# Patient Record
Sex: MALE | Race: WHITE | Employment: FULL TIME | URBAN - METROPOLITAN AREA
[De-identification: names, ages, dates, MRNs, and addresses within clinical notes are randomized per-mention and may not be internally consistent; named-entity substitution may affect disease eponyms.]

---

## 2017-08-28 ENCOUNTER — HOSPITAL ENCOUNTER (EMERGENCY)
Facility: HOSPITAL | Age: 37
Discharge: HOME | End: 2017-08-28
Attending: EMERGENCY MEDICINE
Payer: COMMERCIAL

## 2017-08-28 VITALS
OXYGEN SATURATION: 97 % | TEMPERATURE: 98 F | SYSTOLIC BLOOD PRESSURE: 143 MMHG | HEIGHT: 71 IN | WEIGHT: 220 LBS | BODY MASS INDEX: 30.8 KG/M2 | RESPIRATION RATE: 17 BRPM | HEART RATE: 88 BPM | DIASTOLIC BLOOD PRESSURE: 87 MMHG

## 2017-08-28 DIAGNOSIS — B07.0 PLANTAR WART OF RIGHT FOOT: Primary | ICD-10-CM

## 2017-08-28 PROCEDURE — 99282 EMERGENCY DEPT VISIT SF MDM: CPT

## 2017-08-29 NOTE — ED INITIAL ASSESSMENT (HPI)
Pt ambulatory to er cc right foot = wart x two wks & yesterday used OTC wart remover/freezer = now larger and redness.  Pain with wt bearing

## 2017-08-29 NOTE — ED PROVIDER NOTES
Patient Seen in: [facility] Emergency Department    History   Patient presents with:  Cellulitis (integumentary, infectious)    Stated Complaint: [place]    HPI    Previously healthy 18-year-old presents for evaluation of a wart on h evidence of infection a dressing was placed and he was given a postop shoe and can follow-up with podiatry as needed return for signs or symptoms of infection such as increased redness or drainage of pus.     Disposition and Plan     Clinical Impression:  P

## 2019-01-11 ENCOUNTER — APPOINTMENT (OUTPATIENT)
Dept: GENERAL RADIOLOGY | Age: 39
End: 2019-01-11
Attending: EMERGENCY MEDICINE
Payer: COMMERCIAL

## 2019-01-11 ENCOUNTER — APPOINTMENT (OUTPATIENT)
Dept: ULTRASOUND IMAGING | Age: 39
End: 2019-01-11
Attending: EMERGENCY MEDICINE
Payer: COMMERCIAL

## 2019-01-11 ENCOUNTER — HOSPITAL ENCOUNTER (EMERGENCY)
Age: 39
Discharge: HOME | End: 2019-01-11
Attending: EMERGENCY MEDICINE
Payer: COMMERCIAL

## 2019-01-11 VITALS
DIASTOLIC BLOOD PRESSURE: 84 MMHG | BODY MASS INDEX: 30.8 KG/M2 | HEIGHT: 71 IN | WEIGHT: 220 LBS | OXYGEN SATURATION: 99 % | TEMPERATURE: 98 F | RESPIRATION RATE: 16 BRPM | SYSTOLIC BLOOD PRESSURE: 138 MMHG | HEART RATE: 87 BPM

## 2019-01-11 DIAGNOSIS — S86.112A GASTROCNEMIUS MUSCLE STRAIN, LEFT, INITIAL ENCOUNTER: Primary | ICD-10-CM

## 2019-01-11 PROCEDURE — 99284 EMERGENCY DEPT VISIT MOD MDM: CPT

## 2019-01-11 PROCEDURE — 93971 EXTREMITY STUDY: CPT | Performed by: EMERGENCY MEDICINE

## 2019-01-11 PROCEDURE — 73590 X-RAY EXAM OF LOWER LEG: CPT | Performed by: EMERGENCY MEDICINE

## 2019-01-12 NOTE — ED INITIAL ASSESSMENT (HPI)
Pt to the ED for evaluation of L leg pain that started after a \"pushing\" injury on Monday. No medications taken PTA.

## 2019-01-12 NOTE — ED PROVIDER NOTES
Patient Seen in: THE [facility] Emergency Department In [place]    History   Patient presents with:  Lower Extremity Injury (musculoskeletal)    Stated Complaint: left leg pain    HPI    Patient complains of left leg pain.   Patient was actively plantar flexin Achilles tendon and muscle belly of the medial gastroc. The lateral gastroc is nontender. The Achilles itself is nontender. Iverson's test shows good plantar flexion of the foot. Dorsalis pedis pulses strong and symmetric.   Sensation intact light touc visit.  There is no disposition time on file for this visit.     Follow-up:  Braulio Osorio MD  [address] [facility] [phone]      As needed        Medications Prescribed:  There are no discharge medications for this patien

## 2019-02-19 ENCOUNTER — TELEPHONE (OUTPATIENT)
Dept: FAMILY MEDICINE CLINIC | Facility: CLINIC | Age: 39
End: 2019-02-19

## 2019-02-19 NOTE — TELEPHONE ENCOUNTER
Pt's wife called back. She states she scheduled the appt for her . She states pt has been having a lot of epigastric pain. He did take otc meds for acid/heartburn that helps but symptoms return.  He is currently at work in [place] and is unable to come

## 2019-02-25 ENCOUNTER — APPOINTMENT (OUTPATIENT)
Dept: LAB | Age: 39
End: 2019-02-25
Attending: FAMILY MEDICINE
Payer: COMMERCIAL

## 2019-02-25 ENCOUNTER — OFFICE VISIT (OUTPATIENT)
Dept: FAMILY MEDICINE CLINIC | Facility: CLINIC | Age: 39
End: 2019-02-25
Payer: COMMERCIAL

## 2019-02-25 VITALS
RESPIRATION RATE: 16 BRPM | WEIGHT: 226 LBS | BODY MASS INDEX: 30.61 KG/M2 | SYSTOLIC BLOOD PRESSURE: 122 MMHG | OXYGEN SATURATION: 99 % | DIASTOLIC BLOOD PRESSURE: 78 MMHG | TEMPERATURE: 98 F | HEIGHT: 72 IN | HEART RATE: 88 BPM

## 2019-02-25 DIAGNOSIS — R07.2 SUBSTERNAL PAIN: ICD-10-CM

## 2019-02-25 DIAGNOSIS — R10.13 EPIGASTRIC PAIN: ICD-10-CM

## 2019-02-25 DIAGNOSIS — Z00.00 ROUTINE ADULT HEALTH MAINTENANCE: Primary | ICD-10-CM

## 2019-02-25 DIAGNOSIS — Z00.00 ROUTINE ADULT HEALTH MAINTENANCE: ICD-10-CM

## 2019-02-25 LAB
ALBUMIN SERPL-MCNC: 4.3 G/DL (ref 3.4–5)
ALBUMIN/GLOB SERPL: 1.2 {RATIO} (ref 1–2)
ALP LIVER SERPL-CCNC: 71 U/L (ref 45–117)
ALT SERPL-CCNC: 96 U/L (ref 16–61)
ANION GAP SERPL CALC-SCNC: 9 MMOL/L (ref 0–18)
AST SERPL-CCNC: 39 U/L (ref 15–37)
BILIRUB SERPL-MCNC: 1.5 MG/DL (ref 0.1–2)
BUN BLD-MCNC: 13 MG/DL (ref 7–18)
BUN/CREAT SERPL: 13.5 (ref 10–20)
CALCIUM BLD-MCNC: 9.1 MG/DL (ref 8.5–10.1)
CHLORIDE SERPL-SCNC: 109 MMOL/L (ref 98–107)
CHOLEST SMN-MCNC: 195 MG/DL (ref ?–200)
CO2 SERPL-SCNC: 24 MMOL/L (ref 21–32)
CREAT BLD-MCNC: 0.96 MG/DL (ref 0.7–1.3)
DEPRECATED RDW RBC AUTO: 42.2 FL (ref 35.1–46.3)
ERYTHROCYTE [DISTWIDTH] IN BLOOD BY AUTOMATED COUNT: 13.1 % (ref 11–15)
GLOBULIN PLAS-MCNC: 3.5 G/DL (ref 2.8–4.4)
GLUCOSE BLD-MCNC: 101 MG/DL (ref 70–99)
HCT VFR BLD AUTO: 50.3 % (ref 39–53)
HDLC SERPL-MCNC: 27 MG/DL (ref 40–59)
HGB BLD-MCNC: 16.9 G/DL (ref 13–17.5)
LDLC SERPL CALC-MCNC: 117 MG/DL (ref ?–100)
M PROTEIN MFR SERPL ELPH: 7.8 G/DL (ref 6.4–8.2)
MCH RBC QN AUTO: 29.4 PG (ref 26–34)
MCHC RBC AUTO-ENTMCNC: 33.6 G/DL (ref 31–37)
MCV RBC AUTO: 87.5 FL (ref 80–100)
NONHDLC SERPL-MCNC: 168 MG/DL (ref ?–130)
OSMOLALITY SERPL CALC.SUM OF ELEC: 294 MOSM/KG (ref 275–295)
PLATELET # BLD AUTO: 176 10(3)UL (ref 150–450)
POTASSIUM SERPL-SCNC: 4.2 MMOL/L (ref 3.5–5.1)
RBC # BLD AUTO: 5.75 X10(6)UL (ref 4.3–5.7)
SODIUM SERPL-SCNC: 142 MMOL/L (ref 136–145)
T4 FREE SERPL-MCNC: 1 NG/DL (ref 0.8–1.7)
TRIGL SERPL-MCNC: 254 MG/DL (ref 30–149)
TSI SER-ACNC: 1.07 MIU/ML (ref 0.36–3.74)
VLDLC SERPL CALC-MCNC: 51 MG/DL (ref 0–30)
WBC # BLD AUTO: 6.9 X10(3) UL (ref 4–11)

## 2019-02-25 PROCEDURE — 83013 H PYLORI (C-13) BREATH: CPT | Performed by: FAMILY MEDICINE

## 2019-02-25 PROCEDURE — 84439 ASSAY OF FREE THYROXINE: CPT | Performed by: FAMILY MEDICINE

## 2019-02-25 PROCEDURE — 80061 LIPID PANEL: CPT | Performed by: FAMILY MEDICINE

## 2019-02-25 PROCEDURE — 36415 COLL VENOUS BLD VENIPUNCTURE: CPT | Performed by: FAMILY MEDICINE

## 2019-02-25 PROCEDURE — 99203 OFFICE O/P NEW LOW 30 MIN: CPT | Performed by: FAMILY MEDICINE

## 2019-02-25 PROCEDURE — 80050 GENERAL HEALTH PANEL: CPT | Performed by: FAMILY MEDICINE

## 2019-02-25 NOTE — PROGRESS NOTES
HPI:   Jie Galeano is a 35year old male that presents for Patient presents with:  Abdominal Pain: started 3 months ago on and off and has been more persistent within the last week.  patient states that he feels like there is a \"ball stuck in his chest\ and rhythm, no murmurs, rubs or gallops. LUNGS: Clear to auscultation bilterally, no rales/rhonchi/wheezing. ABDOMEN:  Soft, nondistended, nontender, bowel sounds normal in all 4 quadrants, no masses, no hepatosplenomegaly.   EXTREMITIES:  No edema, no cy

## 2019-02-25 NOTE — PATIENT INSTRUCTIONS
THE NEXT TIME YOU HAVE THE SYMPTOM OF \"BALL STUCK IN CHEST\" TAKE RANITIDINE(ZANTAC) 150MG TWICE A DAY AS NEEDED. PLEASE COMPLETE LABS AND H.PYLORI TESTING. FOLLOW UP AFTER THAT LATER THIS WEEK OR NEXT WEEK.

## 2019-02-26 LAB — H. PYLORI BREATH TEST: NEGATIVE

## 2019-03-09 ENCOUNTER — TELEPHONE (OUTPATIENT)
Dept: FAMILY MEDICINE CLINIC | Facility: CLINIC | Age: 39
End: 2019-03-09

## 2019-03-16 ENCOUNTER — OFFICE VISIT (OUTPATIENT)
Dept: FAMILY MEDICINE CLINIC | Facility: CLINIC | Age: 39
End: 2019-03-16
Payer: COMMERCIAL

## 2019-03-16 ENCOUNTER — TELEPHONE (OUTPATIENT)
Dept: FAMILY MEDICINE CLINIC | Facility: CLINIC | Age: 39
End: 2019-03-16

## 2019-03-16 VITALS
DIASTOLIC BLOOD PRESSURE: 80 MMHG | OXYGEN SATURATION: 98 % | SYSTOLIC BLOOD PRESSURE: 130 MMHG | HEART RATE: 93 BPM | WEIGHT: 227 LBS | BODY MASS INDEX: 30.75 KG/M2 | TEMPERATURE: 98 F | RESPIRATION RATE: 12 BRPM | HEIGHT: 72 IN

## 2019-03-16 DIAGNOSIS — R74.8 ELEVATED LIVER ENZYMES: Primary | ICD-10-CM

## 2019-03-16 DIAGNOSIS — R73.9 ELEVATED BLOOD SUGAR: ICD-10-CM

## 2019-03-16 DIAGNOSIS — R79.89 ELEVATED LFTS: Primary | ICD-10-CM

## 2019-03-16 DIAGNOSIS — E78.00 HIGH CHOLESTEROL: ICD-10-CM

## 2019-03-16 DIAGNOSIS — E66.09 CLASS 1 OBESITY DUE TO EXCESS CALORIES WITHOUT SERIOUS COMORBIDITY WITH BODY MASS INDEX (BMI) OF 30.0 TO 30.9 IN ADULT: ICD-10-CM

## 2019-03-16 PROCEDURE — 99214 OFFICE O/P EST MOD 30 MIN: CPT | Performed by: FAMILY MEDICINE

## 2019-03-16 NOTE — TELEPHONE ENCOUNTER
Please inform patient to get abdominal ultrasound done when he can. Also to have repeat CMP nonfasting in 1 month and follow-up after that.

## 2019-03-16 NOTE — PROGRESS NOTES
HPI:   Luz Brandt is a 35year old male that presents for lab results and epigastric pain    Patient states epigastric pain has resolved  Lab results    Past medical, surgical, family and social history reviewed in detail with patient.   Updated where a enzymes  - US ABDOMEN LIMITED (CPT=76705); Future    2. Elevated blood sugar    3. High cholesterol    4.  Class 1 obesity due to excess calories without serious comorbidity with body mass index (BMI) of 30.0 to 30.9 in adult    Abdominal pain has resolved

## 2019-03-18 NOTE — TELEPHONE ENCOUNTER
See Recent lab results. Pt informed of both test results and recommendations and he expressed understanding and agreement. Task completed.

## 2020-11-18 ENCOUNTER — OFFICE VISIT (OUTPATIENT)
Dept: FAMILY MEDICINE CLINIC | Facility: CLINIC | Age: 40
End: 2020-11-18
Payer: COMMERCIAL

## 2020-11-18 VITALS
TEMPERATURE: 99 F | HEART RATE: 85 BPM | HEIGHT: 72 IN | SYSTOLIC BLOOD PRESSURE: 138 MMHG | WEIGHT: 217 LBS | DIASTOLIC BLOOD PRESSURE: 88 MMHG | OXYGEN SATURATION: 99 % | RESPIRATION RATE: 20 BRPM | BODY MASS INDEX: 29.39 KG/M2

## 2020-11-18 DIAGNOSIS — Z20.822 SUSPECTED COVID-19 VIRUS INFECTION: Primary | ICD-10-CM

## 2020-11-18 PROCEDURE — 99072 ADDL SUPL MATRL&STAF TM PHE: CPT | Performed by: NURSE PRACTITIONER

## 2020-11-18 PROCEDURE — 3075F SYST BP GE 130 - 139MM HG: CPT | Performed by: NURSE PRACTITIONER

## 2020-11-18 PROCEDURE — 3079F DIAST BP 80-89 MM HG: CPT | Performed by: NURSE PRACTITIONER

## 2020-11-18 PROCEDURE — 3008F BODY MASS INDEX DOCD: CPT | Performed by: NURSE PRACTITIONER

## 2020-11-18 PROCEDURE — 99213 OFFICE O/P EST LOW 20 MIN: CPT | Performed by: NURSE PRACTITIONER

## 2020-11-18 NOTE — PROGRESS NOTES
CHIEF COMPLAINT:   Patient presents with:  Upper Respiratory Infection      HPI:   Inna Grubbs is a 38year old male who presents for upper respiratory symptoms for  3 days.  Patient reports sore throat only at the beginning of sx's, congestion, feverish Jazmyne Minor is a 38year old male who presents with upper respiratory symptoms that are consistent with    ASSESSMENT:   Kiel Silveira was seen today for upper respiratory infection.     Diagnoses and all orders for this visit:    Suspected COVID-19 virus infec * You provided care at home to someone who is sick with COVID-19  * You had direct physical contact with the person (touched, hugged, or kissed them)  * You shared eating or drinking utensils  * They sneezed, coughed, or somehow got respiratory droplets on If you are awaiting test results or are confirmed positive for COVID -19, and your symptoms worsen at home with symptoms such as: extreme weakness, difficult breathing, or unrelenting fevers greater than 100.4 degrees Fahrenheit, you should contact your he Sean Neil, in conjunction with Sheri Castaneda, is looking for patients who have recovered from COVID-19 and would be interested in donating plasma.     Convalescent plasma is a component of blood that, in people who have recovered from COVID-19, [email or web address]/  https://www.cdc.gov/coronavirus/2019-ncov/

## 2020-11-18 NOTE — PATIENT INSTRUCTIONS
You are being tested for Covid 19 today.      If the Covid test is positive then self isolate for 10 days from the onset of symptoms  If the Covid test is not positive then self isolate for 14 days from the date of the [facility]    Coronavirus Disease 2019 1. Stay home from work, school, and away from other public places. If you must go out, avoid using any kind of public transportation, ridesharing, or taxis. 2. Monitor your symptoms carefully.  If your symptoms get worse, call your healthcare provider imm If you have tested positive for COVID-19, you should remain under home isolation precautions following the below guidelines:  ? At least 24 hours have passed since recovery defined as resolution of fever without the use of fever-reducing medications; and If you would be interested in donating your plasma to help treat others diagnosed with the virus, please contact [place] directly on their website: [email or web address]    Who is eligible to donate convalescent plasma?

## 2021-06-11 ENCOUNTER — OFFICE VISIT (OUTPATIENT)
Dept: FAMILY MEDICINE CLINIC | Facility: CLINIC | Age: 41
End: 2021-06-11
Payer: COMMERCIAL

## 2021-06-11 VITALS
HEART RATE: 72 BPM | OXYGEN SATURATION: 100 % | RESPIRATION RATE: 18 BRPM | DIASTOLIC BLOOD PRESSURE: 99 MMHG | SYSTOLIC BLOOD PRESSURE: 138 MMHG | WEIGHT: 222.19 LBS | TEMPERATURE: 99 F | BODY MASS INDEX: 30 KG/M2

## 2021-06-11 DIAGNOSIS — Z72.0 TOBACCO USE: Primary | ICD-10-CM

## 2021-06-11 DIAGNOSIS — H66.009 ACUTE SUPPURATIVE OTITIS MEDIA WITHOUT SPONTANEOUS RUPTURE OF EAR DRUM, RECURRENCE NOT SPECIFIED, UNSPECIFIED LATERALITY: ICD-10-CM

## 2021-06-11 DIAGNOSIS — H61.22 IMPACTED CERUMEN OF LEFT EAR: ICD-10-CM

## 2021-06-11 DIAGNOSIS — J20.9 ACUTE BRONCHITIS, UNSPECIFIED ORGANISM: ICD-10-CM

## 2021-06-11 PROCEDURE — 87880 STREP A ASSAY W/OPTIC: CPT | Performed by: NURSE PRACTITIONER

## 2021-06-11 PROCEDURE — 99213 OFFICE O/P EST LOW 20 MIN: CPT | Performed by: NURSE PRACTITIONER

## 2021-06-11 PROCEDURE — U0002 COVID-19 LAB TEST NON-CDC: HCPCS | Performed by: NURSE PRACTITIONER

## 2021-06-11 PROCEDURE — 3080F DIAST BP >= 90 MM HG: CPT | Performed by: NURSE PRACTITIONER

## 2021-06-11 PROCEDURE — 3075F SYST BP GE 130 - 139MM HG: CPT | Performed by: NURSE PRACTITIONER

## 2021-06-11 RX ORDER — BENZONATATE 200 MG/1
200 CAPSULE ORAL 3 TIMES DAILY PRN
Qty: 20 CAPSULE | Refills: 0 | Status: SHIPPED | OUTPATIENT
Start: 2021-06-11 | End: 2021-06-18

## 2021-06-11 RX ORDER — AZITHROMYCIN 250 MG/1
TABLET, FILM COATED ORAL
Qty: 6 TABLET | Refills: 0 | Status: SHIPPED | OUTPATIENT
Start: 2021-06-11 | End: 2021-06-16

## 2021-06-11 RX ORDER — ALBUTEROL SULFATE 90 UG/1
2 AEROSOL, METERED RESPIRATORY (INHALATION) EVERY 6 HOURS PRN
Qty: 1 EACH | Refills: 0 | Status: SHIPPED | OUTPATIENT
Start: 2021-06-11

## 2021-06-11 NOTE — PROGRESS NOTES
CHIEF COMPLAINT:   Patient presents with:  Upper Respiratory Infection      HPI:   Hector Asif is a 42year old male who presents for upper respiratory symptoms for  6 days.  Patient reports sore throat, congestion, cough with intermittently colored denies chest pain or palpitations   GI: denies N/V/C or abdominal pain  NEURO:  headaches    EXAM:   BP (!) 138/99   Pulse 72   Temp 98.8 °F (37.1 °C)   Resp 18   Wt 222 lb 3.2 oz (100.8 kg)   SpO2 100%   BMI 30.14 kg/m²   GENERAL: well developed, well nou THEN 1 tablet (250 mg total) daily for 4 days. • Albuterol Sulfate  (90 Base) MCG/ACT Inhalation Aero Soln 1 each 0     Sig: Inhale 2 puffs into the lungs every 6 (six) hours as needed for Wheezing or Shortness of Breath.    • benzonatate 200 MG Or

## 2022-04-07 ENCOUNTER — OFFICE VISIT (OUTPATIENT)
Dept: FAMILY MEDICINE CLINIC | Facility: CLINIC | Age: 42
End: 2022-04-07
Payer: COMMERCIAL

## 2022-04-07 VITALS
TEMPERATURE: 99 F | SYSTOLIC BLOOD PRESSURE: 132 MMHG | BODY MASS INDEX: 31 KG/M2 | WEIGHT: 225 LBS | RESPIRATION RATE: 18 BRPM | HEART RATE: 89 BPM | DIASTOLIC BLOOD PRESSURE: 80 MMHG | OXYGEN SATURATION: 99 %

## 2022-04-07 DIAGNOSIS — R10.13 EPIGASTRIC PAIN: ICD-10-CM

## 2022-04-07 DIAGNOSIS — K29.00 ACUTE GASTRITIS WITHOUT HEMORRHAGE, UNSPECIFIED GASTRITIS TYPE: Primary | ICD-10-CM

## 2022-04-07 PROCEDURE — 3075F SYST BP GE 130 - 139MM HG: CPT | Performed by: NURSE PRACTITIONER

## 2022-04-07 PROCEDURE — 99213 OFFICE O/P EST LOW 20 MIN: CPT | Performed by: NURSE PRACTITIONER

## 2022-04-07 PROCEDURE — 3079F DIAST BP 80-89 MM HG: CPT | Performed by: NURSE PRACTITIONER

## 2022-04-07 RX ORDER — OMEPRAZOLE 40 MG/1
40 CAPSULE, DELAYED RELEASE ORAL DAILY
Qty: 14 CAPSULE | Refills: 1 | Status: SHIPPED | OUTPATIENT
Start: 2022-04-07 | End: 2022-04-21

## 2022-08-09 ENCOUNTER — OFFICE VISIT (OUTPATIENT)
Dept: FAMILY MEDICINE CLINIC | Facility: CLINIC | Age: 42
End: 2022-08-09
Payer: COMMERCIAL

## 2022-08-09 VITALS
RESPIRATION RATE: 16 BRPM | BODY MASS INDEX: 29.12 KG/M2 | HEART RATE: 127 BPM | TEMPERATURE: 98 F | OXYGEN SATURATION: 97 % | SYSTOLIC BLOOD PRESSURE: 118 MMHG | HEIGHT: 72 IN | DIASTOLIC BLOOD PRESSURE: 70 MMHG | WEIGHT: 215 LBS

## 2022-08-09 DIAGNOSIS — U07.1 COVID-19 VIRUS INFECTION: Primary | ICD-10-CM

## 2022-08-09 DIAGNOSIS — R05.1 ACUTE COUGH: ICD-10-CM

## 2022-08-09 DIAGNOSIS — R53.83 FATIGUE, UNSPECIFIED TYPE: ICD-10-CM

## 2022-08-09 LAB
OPERATOR ID: ABNORMAL
POCT LOT NUMBER: ABNORMAL
RAPID SARS-COV-2 BY PCR: DETECTED

## 2022-08-09 PROCEDURE — 3074F SYST BP LT 130 MM HG: CPT | Performed by: FAMILY MEDICINE

## 2022-08-09 PROCEDURE — U0002 COVID-19 LAB TEST NON-CDC: HCPCS | Performed by: FAMILY MEDICINE

## 2022-08-09 PROCEDURE — 3078F DIAST BP <80 MM HG: CPT | Performed by: FAMILY MEDICINE

## 2022-08-09 PROCEDURE — 3008F BODY MASS INDEX DOCD: CPT | Performed by: FAMILY MEDICINE

## 2022-08-09 PROCEDURE — 99213 OFFICE O/P EST LOW 20 MIN: CPT | Performed by: FAMILY MEDICINE

## 2023-05-12 ENCOUNTER — APPOINTMENT (OUTPATIENT)
Dept: ULTRASOUND IMAGING | Age: 43
End: 2023-05-12
Attending: EMERGENCY MEDICINE
Payer: COMMERCIAL

## 2023-05-12 ENCOUNTER — HOSPITAL ENCOUNTER (EMERGENCY)
Age: 43
Discharge: HOME | End: 2023-05-12
Attending: EMERGENCY MEDICINE
Payer: COMMERCIAL

## 2023-05-12 VITALS
HEIGHT: 72 IN | BODY MASS INDEX: 29.12 KG/M2 | OXYGEN SATURATION: 100 % | WEIGHT: 215 LBS | SYSTOLIC BLOOD PRESSURE: 119 MMHG | HEART RATE: 65 BPM | RESPIRATION RATE: 16 BRPM | DIASTOLIC BLOOD PRESSURE: 80 MMHG | TEMPERATURE: 98 F

## 2023-05-12 DIAGNOSIS — L28.2 PRURITIC RASH: Primary | ICD-10-CM

## 2023-05-12 DIAGNOSIS — R10.13 ABDOMINAL PAIN, EPIGASTRIC: ICD-10-CM

## 2023-05-12 LAB
ALBUMIN SERPL-MCNC: 4.3 G/DL (ref 3.4–5)
ALBUMIN/GLOB SERPL: 1.3 {RATIO} (ref 1–2)
ALP LIVER SERPL-CCNC: 86 U/L
ALT SERPL-CCNC: 56 U/L
ANION GAP SERPL CALC-SCNC: 4 MMOL/L (ref 0–18)
AST SERPL-CCNC: 25 U/L (ref 15–37)
BASOPHILS # BLD AUTO: 0.02 X10(3) UL (ref 0–0.2)
BASOPHILS NFR BLD AUTO: 0.2 %
BILIRUB SERPL-MCNC: 1 MG/DL (ref 0.1–2)
BILIRUB UR QL STRIP.AUTO: NEGATIVE
BUN BLD-MCNC: 13 MG/DL (ref 7–18)
CALCIUM BLD-MCNC: 9.4 MG/DL (ref 8.5–10.1)
CHLORIDE SERPL-SCNC: 111 MMOL/L (ref 98–112)
CLARITY UR REFRACT.AUTO: CLEAR
CO2 SERPL-SCNC: 24 MMOL/L (ref 21–32)
COLOR UR AUTO: YELLOW
CREAT BLD-MCNC: 1.02 MG/DL
EOSINOPHIL # BLD AUTO: 0.36 X10(3) UL (ref 0–0.7)
EOSINOPHIL NFR BLD AUTO: 4.2 %
ERYTHROCYTE [DISTWIDTH] IN BLOOD BY AUTOMATED COUNT: 13.2 %
GFR SERPLBLD BASED ON 1.73 SQ M-ARVRAT: 93 ML/MIN/1.73M2 (ref 60–?)
GLOBULIN PLAS-MCNC: 3.3 G/DL (ref 2.8–4.4)
GLUCOSE BLD-MCNC: 96 MG/DL (ref 70–99)
GLUCOSE UR STRIP.AUTO-MCNC: NEGATIVE MG/DL
HCT VFR BLD AUTO: 47.6 %
HGB BLD-MCNC: 15.8 G/DL
IMM GRANULOCYTES # BLD AUTO: 0.03 X10(3) UL (ref 0–1)
IMM GRANULOCYTES NFR BLD: 0.3 %
KETONES UR STRIP.AUTO-MCNC: NEGATIVE MG/DL
LEUKOCYTE ESTERASE UR QL STRIP.AUTO: NEGATIVE
LIPASE SERPL-CCNC: 66 U/L (ref 13–75)
LYMPHOCYTES # BLD AUTO: 2.44 X10(3) UL (ref 1–4)
LYMPHOCYTES NFR BLD AUTO: 28.3 %
MCH RBC QN AUTO: 28.8 PG (ref 26–34)
MCHC RBC AUTO-ENTMCNC: 33.2 G/DL (ref 31–37)
MCV RBC AUTO: 86.7 FL
MONOCYTES # BLD AUTO: 0.43 X10(3) UL (ref 0.1–1)
MONOCYTES NFR BLD AUTO: 5 %
NEUTROPHILS # BLD AUTO: 5.34 X10 (3) UL (ref 1.5–7.7)
NEUTROPHILS # BLD AUTO: 5.34 X10(3) UL (ref 1.5–7.7)
NEUTROPHILS NFR BLD AUTO: 62 %
NITRITE UR QL STRIP.AUTO: NEGATIVE
OSMOLALITY SERPL CALC.SUM OF ELEC: 288 MOSM/KG (ref 275–295)
PH UR STRIP.AUTO: 7 [PH] (ref 5–8)
PLATELET # BLD AUTO: 191 10(3)UL (ref 150–450)
POTASSIUM SERPL-SCNC: 3.8 MMOL/L (ref 3.5–5.1)
PROT SERPL-MCNC: 7.6 G/DL (ref 6.4–8.2)
PROT UR STRIP.AUTO-MCNC: NEGATIVE MG/DL
RBC # BLD AUTO: 5.49 X10(6)UL
RBC UR QL AUTO: NEGATIVE
SODIUM SERPL-SCNC: 139 MMOL/L (ref 136–145)
SP GR UR STRIP.AUTO: 1.02 (ref 1–1.03)
UROBILINOGEN UR STRIP.AUTO-MCNC: 0.2 MG/DL
WBC # BLD AUTO: 8.6 X10(3) UL (ref 4–11)

## 2023-05-12 PROCEDURE — 96374 THER/PROPH/DIAG INJ IV PUSH: CPT

## 2023-05-12 PROCEDURE — 81003 URINALYSIS AUTO W/O SCOPE: CPT | Performed by: EMERGENCY MEDICINE

## 2023-05-12 PROCEDURE — C9113 INJ PANTOPRAZOLE SODIUM, VIA: HCPCS | Performed by: EMERGENCY MEDICINE

## 2023-05-12 PROCEDURE — 99284 EMERGENCY DEPT VISIT MOD MDM: CPT

## 2023-05-12 PROCEDURE — 85025 COMPLETE CBC W/AUTO DIFF WBC: CPT | Performed by: EMERGENCY MEDICINE

## 2023-05-12 PROCEDURE — 83690 ASSAY OF LIPASE: CPT | Performed by: EMERGENCY MEDICINE

## 2023-05-12 PROCEDURE — 80053 COMPREHEN METABOLIC PANEL: CPT | Performed by: EMERGENCY MEDICINE

## 2023-05-12 PROCEDURE — 76700 US EXAM ABDOM COMPLETE: CPT | Performed by: EMERGENCY MEDICINE

## 2023-05-12 PROCEDURE — 96375 TX/PRO/DX INJ NEW DRUG ADDON: CPT

## 2023-05-12 RX ORDER — DIPHENHYDRAMINE HYDROCHLORIDE 50 MG/ML
50 INJECTION INTRAMUSCULAR; INTRAVENOUS ONCE
Status: COMPLETED | OUTPATIENT
Start: 2023-05-12 | End: 2023-05-12

## 2023-05-12 RX ORDER — METHYLPREDNISOLONE SODIUM SUCCINATE 40 MG/ML
40 INJECTION, POWDER, LYOPHILIZED, FOR SOLUTION INTRAMUSCULAR; INTRAVENOUS ONCE
Status: COMPLETED | OUTPATIENT
Start: 2023-05-12 | End: 2023-05-12

## 2023-05-12 RX ORDER — ONDANSETRON 2 MG/ML
4 INJECTION INTRAMUSCULAR; INTRAVENOUS ONCE
Status: COMPLETED | OUTPATIENT
Start: 2023-05-12 | End: 2023-05-12

## 2023-05-12 RX ORDER — METHYLPREDNISOLONE 4 MG/1
TABLET ORAL
Qty: 1 EACH | Refills: 0 | Status: SHIPPED | OUTPATIENT
Start: 2023-05-12

## 2023-05-12 RX ORDER — HYDROXYZINE HYDROCHLORIDE 25 MG/1
TABLET, FILM COATED ORAL EVERY 4 HOURS PRN
Qty: 20 TABLET | Refills: 0 | Status: SHIPPED | OUTPATIENT
Start: 2023-05-12 | End: 2023-06-11

## 2023-05-12 NOTE — DISCHARGE INSTRUCTIONS
Return to the emergency department for tongue swelling or difficulty breathing. Wash your linens and recent clothing in hot water.

## 2023-12-25 ENCOUNTER — APPOINTMENT (OUTPATIENT)
Dept: CT IMAGING | Age: 43
DRG: 419 | End: 2023-12-25
Attending: EMERGENCY MEDICINE
Payer: COMMERCIAL

## 2023-12-25 ENCOUNTER — APPOINTMENT (OUTPATIENT)
Dept: GENERAL RADIOLOGY | Facility: HOSPITAL | Age: 43
DRG: 419 | End: 2023-12-25
Attending: SURGERY
Payer: COMMERCIAL

## 2023-12-25 ENCOUNTER — HOSPITAL ENCOUNTER (INPATIENT)
Facility: HOSPITAL | Age: 43
LOS: 1 days | Discharge: HOME | DRG: 419 | End: 2023-12-26
Attending: EMERGENCY MEDICINE | Admitting: SURGERY
Payer: COMMERCIAL

## 2023-12-25 ENCOUNTER — ANESTHESIA (OUTPATIENT)
Dept: SURGERY | Facility: HOSPITAL | Age: 43
End: 2023-12-25
Payer: COMMERCIAL

## 2023-12-25 ENCOUNTER — HOSPITAL ENCOUNTER (OUTPATIENT)
Facility: HOSPITAL | Age: 43
Setting detail: OBSERVATION
Discharge: HOME | DRG: 419 | End: 2023-12-26
Attending: EMERGENCY MEDICINE | Admitting: SURGERY
Payer: COMMERCIAL

## 2023-12-25 ENCOUNTER — ANESTHESIA EVENT (OUTPATIENT)
Dept: SURGERY | Facility: HOSPITAL | Age: 43
End: 2023-12-25
Payer: COMMERCIAL

## 2023-12-25 DIAGNOSIS — K81.9 CHOLECYSTITIS: ICD-10-CM

## 2023-12-25 DIAGNOSIS — K81.0 ACUTE CHOLECYSTITIS: Primary | ICD-10-CM

## 2023-12-25 PROBLEM — R79.89 ELEVATED LFTS: Status: ACTIVE | Noted: 2023-12-25

## 2023-12-25 LAB
ALBUMIN SERPL-MCNC: 4.3 G/DL (ref 3.4–5)
ALBUMIN/GLOB SERPL: 1.1 {RATIO} (ref 1–2)
ALP LIVER SERPL-CCNC: 79 U/L
ALT SERPL-CCNC: 179 U/L
ANION GAP SERPL CALC-SCNC: 4 MMOL/L (ref 0–18)
AST SERPL-CCNC: 69 U/L (ref 15–37)
BASOPHILS # BLD AUTO: 0.04 X10(3) UL (ref 0–0.2)
BASOPHILS NFR BLD AUTO: 0.4 %
BILIRUB SERPL-MCNC: 0.5 MG/DL (ref 0.1–2)
BUN BLD-MCNC: 18 MG/DL (ref 9–23)
CALCIUM BLD-MCNC: 8.8 MG/DL (ref 8.5–10.1)
CHLORIDE SERPL-SCNC: 107 MMOL/L (ref 98–112)
CO2 SERPL-SCNC: 29 MMOL/L (ref 21–32)
CREAT BLD-MCNC: 1.27 MG/DL
EGFRCR SERPLBLD CKD-EPI 2021: 71 ML/MIN/1.73M2 (ref 60–?)
EOSINOPHIL # BLD AUTO: 0.19 X10(3) UL (ref 0–0.7)
EOSINOPHIL NFR BLD AUTO: 1.8 %
ERYTHROCYTE [DISTWIDTH] IN BLOOD BY AUTOMATED COUNT: 13.1 %
GLOBULIN PLAS-MCNC: 3.9 G/DL (ref 2.8–4.4)
GLUCOSE BLD-MCNC: 154 MG/DL (ref 70–99)
HCT VFR BLD AUTO: 51.7 %
HGB BLD-MCNC: 17 G/DL
IMM GRANULOCYTES # BLD AUTO: 0.07 X10(3) UL (ref 0–1)
IMM GRANULOCYTES NFR BLD: 0.7 %
LIPASE SERPL-CCNC: 64 U/L (ref 13–75)
LYMPHOCYTES # BLD AUTO: 2.23 X10(3) UL (ref 1–4)
LYMPHOCYTES NFR BLD AUTO: 20.7 %
MCH RBC QN AUTO: 28.7 PG (ref 26–34)
MCHC RBC AUTO-ENTMCNC: 32.9 G/DL (ref 31–37)
MCV RBC AUTO: 87.3 FL
MONOCYTES # BLD AUTO: 0.39 X10(3) UL (ref 0.1–1)
MONOCYTES NFR BLD AUTO: 3.6 %
NEUTROPHILS # BLD AUTO: 7.84 X10 (3) UL (ref 1.5–7.7)
NEUTROPHILS # BLD AUTO: 7.84 X10(3) UL (ref 1.5–7.7)
NEUTROPHILS NFR BLD AUTO: 72.8 %
OSMOLALITY SERPL CALC.SUM OF ELEC: 295 MOSM/KG (ref 275–295)
PLATELET # BLD AUTO: 216 10(3)UL (ref 150–450)
POTASSIUM SERPL-SCNC: 4.1 MMOL/L (ref 3.5–5.1)
PROT SERPL-MCNC: 8.2 G/DL (ref 6.4–8.2)
RBC # BLD AUTO: 5.92 X10(6)UL
SODIUM SERPL-SCNC: 140 MMOL/L (ref 136–145)
TROPONIN I SERPL HS-MCNC: 6 NG/L
WBC # BLD AUTO: 10.8 X10(3) UL (ref 4–11)

## 2023-12-25 PROCEDURE — 74300 X-RAY BILE DUCTS/PANCREAS: CPT | Performed by: SURGERY

## 2023-12-25 PROCEDURE — 99223 1ST HOSP IP/OBS HIGH 75: CPT | Performed by: SURGERY

## 2023-12-25 PROCEDURE — 74177 CT ABD & PELVIS W/CONTRAST: CPT | Performed by: EMERGENCY MEDICINE

## 2023-12-25 PROCEDURE — 0FT44ZZ RESECTION OF GALLBLADDER, PERCUTANEOUS ENDOSCOPIC APPROACH: ICD-10-PCS | Performed by: SURGERY

## 2023-12-25 PROCEDURE — BF101ZZ FLUOROSCOPY OF BILE DUCTS USING LOW OSMOLAR CONTRAST: ICD-10-PCS | Performed by: SURGERY

## 2023-12-25 PROCEDURE — 47563 LAPARO CHOLECYSTECTOMY/GRAPH: CPT | Performed by: SURGERY

## 2023-12-25 RX ORDER — HYDROCODONE BITARTRATE AND ACETAMINOPHEN 5; 325 MG/1; MG/1
1 TABLET ORAL EVERY 4 HOURS PRN
Status: DISCONTINUED | OUTPATIENT
Start: 2023-12-25 | End: 2023-12-26

## 2023-12-25 RX ORDER — HEPARIN SODIUM 5000 [USP'U]/ML
5000 INJECTION, SOLUTION INTRAVENOUS; SUBCUTANEOUS EVERY 8 HOURS SCHEDULED
Status: DISCONTINUED | OUTPATIENT
Start: 2023-12-25 | End: 2023-12-26

## 2023-12-25 RX ORDER — ACETAMINOPHEN 325 MG/1
650 TABLET ORAL
Status: DISCONTINUED | OUTPATIENT
Start: 2023-12-25 | End: 2023-12-26

## 2023-12-25 RX ORDER — NALOXONE HYDROCHLORIDE 0.4 MG/ML
0.08 INJECTION, SOLUTION INTRAMUSCULAR; INTRAVENOUS; SUBCUTANEOUS AS NEEDED
Status: DISCONTINUED | OUTPATIENT
Start: 2023-12-25 | End: 2023-12-25 | Stop reason: HOSPADM

## 2023-12-25 RX ORDER — LABETALOL HYDROCHLORIDE 5 MG/ML
INJECTION, SOLUTION INTRAVENOUS AS NEEDED
Status: DISCONTINUED | OUTPATIENT
Start: 2023-12-25 | End: 2023-12-25 | Stop reason: SURG

## 2023-12-25 RX ORDER — FAMOTIDINE 10 MG/ML
20 INJECTION, SOLUTION INTRAVENOUS ONCE
Status: COMPLETED | OUTPATIENT
Start: 2023-12-25 | End: 2023-12-25

## 2023-12-25 RX ORDER — HYDROMORPHONE HYDROCHLORIDE 1 MG/ML
0.4 INJECTION, SOLUTION INTRAMUSCULAR; INTRAVENOUS; SUBCUTANEOUS EVERY 2 HOUR PRN
Status: DISCONTINUED | OUTPATIENT
Start: 2023-12-25 | End: 2023-12-26

## 2023-12-25 RX ORDER — OXYCODONE HYDROCHLORIDE 5 MG/1
5 TABLET ORAL EVERY 4 HOURS PRN
Status: DISCONTINUED | OUTPATIENT
Start: 2023-12-25 | End: 2023-12-26

## 2023-12-25 RX ORDER — HYDROCODONE BITARTRATE AND ACETAMINOPHEN 5; 325 MG/1; MG/1
1 TABLET ORAL ONCE AS NEEDED
Status: DISCONTINUED | OUTPATIENT
Start: 2023-12-25 | End: 2023-12-25 | Stop reason: HOSPADM

## 2023-12-25 RX ORDER — PROCHLORPERAZINE EDISYLATE 5 MG/ML
5 INJECTION INTRAMUSCULAR; INTRAVENOUS EVERY 8 HOURS PRN
Status: DISCONTINUED | OUTPATIENT
Start: 2023-12-25 | End: 2023-12-26

## 2023-12-25 RX ORDER — HYDROMORPHONE HYDROCHLORIDE 1 MG/ML
0.2 INJECTION, SOLUTION INTRAMUSCULAR; INTRAVENOUS; SUBCUTANEOUS EVERY 2 HOUR PRN
Status: DISCONTINUED | OUTPATIENT
Start: 2023-12-25 | End: 2023-12-26

## 2023-12-25 RX ORDER — ONDANSETRON 2 MG/ML
4 INJECTION INTRAMUSCULAR; INTRAVENOUS EVERY 6 HOURS PRN
Status: DISCONTINUED | OUTPATIENT
Start: 2023-12-25 | End: 2023-12-26

## 2023-12-25 RX ORDER — HYDROMORPHONE HYDROCHLORIDE 1 MG/ML
0.8 INJECTION, SOLUTION INTRAMUSCULAR; INTRAVENOUS; SUBCUTANEOUS EVERY 2 HOUR PRN
Status: DISCONTINUED | OUTPATIENT
Start: 2023-12-25 | End: 2023-12-26

## 2023-12-25 RX ORDER — ROCURONIUM BROMIDE 10 MG/ML
INJECTION, SOLUTION INTRAVENOUS AS NEEDED
Status: DISCONTINUED | OUTPATIENT
Start: 2023-12-25 | End: 2023-12-25 | Stop reason: SURG

## 2023-12-25 RX ORDER — ACETAMINOPHEN 500 MG
1000 TABLET ORAL ONCE AS NEEDED
Status: DISCONTINUED | OUTPATIENT
Start: 2023-12-25 | End: 2023-12-25 | Stop reason: HOSPADM

## 2023-12-25 RX ORDER — PROCHLORPERAZINE EDISYLATE 5 MG/ML
5 INJECTION INTRAMUSCULAR; INTRAVENOUS EVERY 8 HOURS PRN
Status: DISCONTINUED | OUTPATIENT
Start: 2023-12-25 | End: 2023-12-25 | Stop reason: HOSPADM

## 2023-12-25 RX ORDER — DEXAMETHASONE SODIUM PHOSPHATE 4 MG/ML
VIAL (ML) INJECTION AS NEEDED
Status: DISCONTINUED | OUTPATIENT
Start: 2023-12-25 | End: 2023-12-25 | Stop reason: SURG

## 2023-12-25 RX ORDER — SODIUM CHLORIDE, SODIUM LACTATE, POTASSIUM CHLORIDE, CALCIUM CHLORIDE 600; 310; 30; 20 MG/100ML; MG/100ML; MG/100ML; MG/100ML
INJECTION, SOLUTION INTRAVENOUS CONTINUOUS
Status: DISCONTINUED | OUTPATIENT
Start: 2023-12-25 | End: 2023-12-26

## 2023-12-25 RX ORDER — ONDANSETRON 2 MG/ML
INJECTION INTRAMUSCULAR; INTRAVENOUS AS NEEDED
Status: DISCONTINUED | OUTPATIENT
Start: 2023-12-25 | End: 2023-12-25 | Stop reason: SURG

## 2023-12-25 RX ORDER — HYDROCODONE BITARTRATE AND ACETAMINOPHEN 5; 325 MG/1; MG/1
2 TABLET ORAL ONCE AS NEEDED
Status: DISCONTINUED | OUTPATIENT
Start: 2023-12-25 | End: 2023-12-25 | Stop reason: HOSPADM

## 2023-12-25 RX ORDER — KETOROLAC TROMETHAMINE 15 MG/ML
15 INJECTION, SOLUTION INTRAMUSCULAR; INTRAVENOUS ONCE
Status: COMPLETED | OUTPATIENT
Start: 2023-12-25 | End: 2023-12-25

## 2023-12-25 RX ORDER — ACETAMINOPHEN 325 MG/1
650 TABLET ORAL EVERY 4 HOURS PRN
Status: DISCONTINUED | OUTPATIENT
Start: 2023-12-25 | End: 2023-12-26

## 2023-12-25 RX ORDER — LABETALOL HYDROCHLORIDE 5 MG/ML
5 INJECTION, SOLUTION INTRAVENOUS EVERY 5 MIN PRN
Status: DISCONTINUED | OUTPATIENT
Start: 2023-12-25 | End: 2023-12-25 | Stop reason: HOSPADM

## 2023-12-25 RX ORDER — HYDROMORPHONE HYDROCHLORIDE 1 MG/ML
1 INJECTION, SOLUTION INTRAMUSCULAR; INTRAVENOUS; SUBCUTANEOUS ONCE
Status: COMPLETED | OUTPATIENT
Start: 2023-12-25 | End: 2023-12-25

## 2023-12-25 RX ORDER — HYDROMORPHONE HYDROCHLORIDE 1 MG/ML
0.2 INJECTION, SOLUTION INTRAMUSCULAR; INTRAVENOUS; SUBCUTANEOUS EVERY 5 MIN PRN
Status: DISCONTINUED | OUTPATIENT
Start: 2023-12-25 | End: 2023-12-25 | Stop reason: HOSPADM

## 2023-12-25 RX ORDER — ONDANSETRON 2 MG/ML
4 INJECTION INTRAMUSCULAR; INTRAVENOUS EVERY 6 HOURS PRN
Status: DISCONTINUED | OUTPATIENT
Start: 2023-12-25 | End: 2023-12-25 | Stop reason: HOSPADM

## 2023-12-25 RX ORDER — HYDROMORPHONE HYDROCHLORIDE 1 MG/ML
0.4 INJECTION, SOLUTION INTRAMUSCULAR; INTRAVENOUS; SUBCUTANEOUS EVERY 5 MIN PRN
Status: DISCONTINUED | OUTPATIENT
Start: 2023-12-25 | End: 2023-12-25 | Stop reason: HOSPADM

## 2023-12-25 RX ORDER — OXYCODONE HYDROCHLORIDE 5 MG/1
2.5 TABLET ORAL EVERY 4 HOURS PRN
Status: DISCONTINUED | OUTPATIENT
Start: 2023-12-25 | End: 2023-12-26

## 2023-12-25 RX ORDER — HYDROMORPHONE HYDROCHLORIDE 1 MG/ML
0.6 INJECTION, SOLUTION INTRAMUSCULAR; INTRAVENOUS; SUBCUTANEOUS EVERY 5 MIN PRN
Status: DISCONTINUED | OUTPATIENT
Start: 2023-12-25 | End: 2023-12-25 | Stop reason: HOSPADM

## 2023-12-25 RX ORDER — SODIUM CHLORIDE 9 MG/ML
INJECTION, SOLUTION INTRAVENOUS ONCE
Status: COMPLETED | OUTPATIENT
Start: 2023-12-25 | End: 2023-12-25

## 2023-12-25 RX ORDER — LIDOCAINE HYDROCHLORIDE 10 MG/ML
INJECTION, SOLUTION EPIDURAL; INFILTRATION; INTRACAUDAL; PERINEURAL AS NEEDED
Status: DISCONTINUED | OUTPATIENT
Start: 2023-12-25 | End: 2023-12-25 | Stop reason: SURG

## 2023-12-25 RX ORDER — KETOROLAC TROMETHAMINE 30 MG/ML
INJECTION, SOLUTION INTRAMUSCULAR; INTRAVENOUS AS NEEDED
Status: DISCONTINUED | OUTPATIENT
Start: 2023-12-25 | End: 2023-12-25 | Stop reason: SURG

## 2023-12-25 RX ORDER — CEFAZOLIN SODIUM 1 G/3ML
INJECTION, POWDER, FOR SOLUTION INTRAMUSCULAR; INTRAVENOUS AS NEEDED
Status: DISCONTINUED | OUTPATIENT
Start: 2023-12-25 | End: 2023-12-25 | Stop reason: SURG

## 2023-12-25 RX ORDER — BUPIVACAINE HYDROCHLORIDE AND EPINEPHRINE 5; 5 MG/ML; UG/ML
INJECTION, SOLUTION EPIDURAL; INTRACAUDAL; PERINEURAL AS NEEDED
Status: DISCONTINUED | OUTPATIENT
Start: 2023-12-25 | End: 2023-12-25 | Stop reason: HOSPADM

## 2023-12-25 RX ORDER — SODIUM CHLORIDE, SODIUM LACTATE, POTASSIUM CHLORIDE, CALCIUM CHLORIDE 600; 310; 30; 20 MG/100ML; MG/100ML; MG/100ML; MG/100ML
INJECTION, SOLUTION INTRAVENOUS CONTINUOUS
Status: DISCONTINUED | OUTPATIENT
Start: 2023-12-25 | End: 2023-12-25 | Stop reason: HOSPADM

## 2023-12-25 RX ORDER — MELATONIN
3 NIGHTLY PRN
Status: DISCONTINUED | OUTPATIENT
Start: 2023-12-25 | End: 2023-12-26

## 2023-12-25 RX ORDER — HEPARIN SODIUM 5000 [USP'U]/ML
5000 INJECTION, SOLUTION INTRAVENOUS; SUBCUTANEOUS EVERY 12 HOURS SCHEDULED
Status: DISCONTINUED | OUTPATIENT
Start: 2023-12-26 | End: 2023-12-26

## 2023-12-25 RX ORDER — HYDROCODONE BITARTRATE AND ACETAMINOPHEN 5; 325 MG/1; MG/1
2 TABLET ORAL EVERY 4 HOURS PRN
Status: DISCONTINUED | OUTPATIENT
Start: 2023-12-25 | End: 2023-12-26

## 2023-12-25 RX ADMIN — LABETALOL HYDROCHLORIDE 5 MG: 5 INJECTION, SOLUTION INTRAVENOUS at 21:21:00

## 2023-12-25 RX ADMIN — CEFAZOLIN SODIUM 2 G: 1 INJECTION, POWDER, FOR SOLUTION INTRAMUSCULAR; INTRAVENOUS at 20:48:00

## 2023-12-25 RX ADMIN — ROCURONIUM BROMIDE 50 MG: 10 INJECTION, SOLUTION INTRAVENOUS at 20:38:00

## 2023-12-25 RX ADMIN — LIDOCAINE HYDROCHLORIDE 50 MG: 10 INJECTION, SOLUTION EPIDURAL; INFILTRATION; INTRACAUDAL; PERINEURAL at 20:38:00

## 2023-12-25 RX ADMIN — KETOROLAC TROMETHAMINE 30 MG: 30 INJECTION, SOLUTION INTRAMUSCULAR; INTRAVENOUS at 21:35:00

## 2023-12-25 RX ADMIN — ONDANSETRON 4 MG: 2 INJECTION INTRAMUSCULAR; INTRAVENOUS at 21:15:00

## 2023-12-25 RX ADMIN — DEXAMETHASONE SODIUM PHOSPHATE 4 MG: 4 MG/ML VIAL (ML) INJECTION at 20:55:00

## 2023-12-25 NOTE — ED QUICK NOTES
Orders for admission, patient is aware of plan and ready to go upstairs. Any questions, please call ED RN Jade Poole at extension [phone]. Patient Covid vaccination status: Unvaccinated     COVID Test Ordered in ED: None    COVID Suspicion at Admission: N/A    Running Infusions:  N/A    Mental Status/LOC at time of transport: A&Ox4    Other pertinent information: Patient's wife to drive him to [facility] via private car.   CIWA score: N/A   NIH score:  N/A

## 2023-12-25 NOTE — PROGRESS NOTES
NURSING ADMISSION NOTE      Patient admitted via Cart  Oriented to room. Safety precautions initiated. Bed in low position. Call light in reach. Received patient from [facility] . Alert and orient x 4 , on room air , vitals stable , npo . Family at bedside . Plan of care discussed ,answered all questions . Verbalized understanding .  Will continue to monitor

## 2023-12-25 NOTE — ED INITIAL ASSESSMENT (HPI)
Patient complains of epigastric pain that is worse after he eats and is worse when he lays flat. Also has some associated nausea and vomiting.

## 2023-12-26 VITALS
HEIGHT: 71 IN | OXYGEN SATURATION: 95 % | RESPIRATION RATE: 18 BRPM | WEIGHT: 220 LBS | SYSTOLIC BLOOD PRESSURE: 131 MMHG | BODY MASS INDEX: 30.8 KG/M2 | DIASTOLIC BLOOD PRESSURE: 80 MMHG | TEMPERATURE: 99 F | HEART RATE: 101 BPM

## 2023-12-26 LAB
ALBUMIN SERPL-MCNC: 3.8 G/DL (ref 3.4–5)
ALBUMIN/GLOB SERPL: 1.2 {RATIO} (ref 1–2)
ALP LIVER SERPL-CCNC: 82 U/L
ALT SERPL-CCNC: 178 U/L
ANION GAP SERPL CALC-SCNC: 5 MMOL/L (ref 0–18)
AST SERPL-CCNC: 98 U/L (ref 15–37)
ATRIAL RATE: 67 BPM
BASOPHILS # BLD AUTO: 0.02 X10(3) UL (ref 0–0.2)
BASOPHILS NFR BLD AUTO: 0.2 %
BILIRUB SERPL-MCNC: 1.7 MG/DL (ref 0.1–2)
BUN BLD-MCNC: 15 MG/DL (ref 9–23)
CALCIUM BLD-MCNC: 8.8 MG/DL (ref 8.5–10.1)
CHLORIDE SERPL-SCNC: 108 MMOL/L (ref 98–112)
CO2 SERPL-SCNC: 25 MMOL/L (ref 21–32)
CREAT BLD-MCNC: 1.1 MG/DL
EGFRCR SERPLBLD CKD-EPI 2021: 85 ML/MIN/1.73M2 (ref 60–?)
EOSINOPHIL # BLD AUTO: 0 X10(3) UL (ref 0–0.7)
EOSINOPHIL NFR BLD AUTO: 0 %
ERYTHROCYTE [DISTWIDTH] IN BLOOD BY AUTOMATED COUNT: 12.9 %
GLOBULIN PLAS-MCNC: 3.3 G/DL (ref 2.8–4.4)
GLUCOSE BLD-MCNC: 128 MG/DL (ref 70–99)
HCT VFR BLD AUTO: 45.8 %
HGB BLD-MCNC: 15.3 G/DL
IMM GRANULOCYTES # BLD AUTO: 0.05 X10(3) UL (ref 0–1)
IMM GRANULOCYTES NFR BLD: 0.4 %
LYMPHOCYTES # BLD AUTO: 1.53 X10(3) UL (ref 1–4)
LYMPHOCYTES NFR BLD AUTO: 13.6 %
MCH RBC QN AUTO: 28.8 PG (ref 26–34)
MCHC RBC AUTO-ENTMCNC: 33.4 G/DL (ref 31–37)
MCV RBC AUTO: 86.3 FL
MONOCYTES # BLD AUTO: 0.4 X10(3) UL (ref 0.1–1)
MONOCYTES NFR BLD AUTO: 3.6 %
NEUTROPHILS # BLD AUTO: 9.22 X10 (3) UL (ref 1.5–7.7)
NEUTROPHILS # BLD AUTO: 9.22 X10(3) UL (ref 1.5–7.7)
NEUTROPHILS NFR BLD AUTO: 82.2 %
OSMOLALITY SERPL CALC.SUM OF ELEC: 288 MOSM/KG (ref 275–295)
P AXIS: 63 DEGREES
P-R INTERVAL: 172 MS
PLATELET # BLD AUTO: 239 10(3)UL (ref 150–450)
POTASSIUM SERPL-SCNC: 4.3 MMOL/L (ref 3.5–5.1)
PROT SERPL-MCNC: 7.1 G/DL (ref 6.4–8.2)
Q-T INTERVAL: 408 MS
QRS DURATION: 88 MS
QTC CALCULATION (BEZET): 431 MS
R AXIS: 23 DEGREES
RBC # BLD AUTO: 5.31 X10(6)UL
SODIUM SERPL-SCNC: 138 MMOL/L (ref 136–145)
T AXIS: 3 DEGREES
VENTRICULAR RATE: 67 BPM
WBC # BLD AUTO: 11.2 X10(3) UL (ref 4–11)

## 2023-12-26 RX ORDER — HYDROCODONE BITARTRATE AND ACETAMINOPHEN 5; 325 MG/1; MG/1
1 TABLET ORAL EVERY 6 HOURS PRN
Qty: 15 TABLET | Refills: 0 | Status: SHIPPED | OUTPATIENT
Start: 2023-12-26

## 2023-12-26 NOTE — OPERATIVE REPORT
[facility]   Operative Note    [facility] Location: OR   [facility] [id number] MRN [id number]   Admission Date 12/25/2023 Operation Date 12/25/2023   Attending Physician Laureano Wang MD Operating Physician Luna Miner MD     Date of procedure:   December 25, 2023    Pre-Operative Diagnosis: Acute cholecystitis, cholelithiasis    Indication for Surgery: Persistent biliary colic and abdominal pain due to acute cholecystitis, cholelithiasis    Post-Operative Diagnosis/Operative Findings: Same as above-negative intraoperative cholangiogram    Procedure performed:  Laparoscopic cholecystectomy with intraoperative cholangiogram    Surgeon(s) and Role:     Sabi Good MD - Primary    Assist:     Christopher Mayberry    Anesthesia: General    History of Present Illness:    48-year-old gentleman who presents to the emergency department in [place] with complaints of epigastric and right upper quadrant abdominal pain. The patient reports that he has had intermittent symptoms of abdominal pain in the epigastrium and the right upper quadrant for approximately 1 year. Over the past 3 weeks he has noted increased fatty food intolerance, abdominal bloating, early satiety. He describes epigastric abdominal pain which radiates to the right upper quadrant and subscapular region. Patient reports that this particular episode was significantly worse than before and did not resolve spontaneously so he sought evaluation in the emergency department. Workup in the ED revealed normal WBC, mild elevation of LFTs however total bilirubin was within normal limits. CT scan abdomen pelvis revealed a dilated gallbladder up to 10 cm with gallbladder wall thickening. The patient was treated for symptoms in the emergency department however despite medication, the patient's abdominal pain and symptoms persisted. The patient was transported to [facility] for definitive surgical intervention.      Treatment options were reviewed in detail with the patient as well as his wife at the bedside. At this time the patient does wish to proceed with laparoscopic cholecystectomy with intraoperative cholangiogram for symptomatic cholecystitis. The patient has no further questions at this time and wishes to proceed with surgery today as discussed    Discussed with patient:  The potential risks and benefits of the procedure were discussed in detail with the patient including but not limited to bleeding, infections, seroma/hematoma formation, postoperative wound complications including development of a hernia, trocar injury, intra-abdominal organ injury, bile leakage, biloma formation, common bile duct injury, conversion to an open procedure, inability to complete the cholangiogram, possible need for a drain, possible recurrence or persistence of symptoms despite surgery, postoperative diarrhea, possible need for further treatment or intervention pending cholangiogram results. These and other potential intraoperative and perioperative risks were discussed and the patient does not have any questions and does wish to proceed with surgery today. Note:  A surgical assistant was essential to the performance and conduct of this case, especially in the performance of the cholangiogram and the careful dissection needed in and around the triangle of Calot and gallbladder hilum. Summary of Procedure: The patient was taken to the operating room and was placed on the OR table in the supine position. After the induction of general anesthesia perioperative antibiotics were given. The patient was then prepped and draped in usual sterile fashion. Using local anesthesia a kirsten-umbilical incision was made and the anterior abdominal fascia was elevated with a Kocker forcep. The Veress needle was introduced into the abdomen and the abdomen was insufflated to 15 mm mercury. The Veress needle was then exchanged for a 11 mm port.  The laparoscope was introduced. A 5 mm epigastric port and two 5 mm lateral ports were placed under direct vision without incidence. The patient was placed into a reverse Trendelenburg position with the right side up. Initial evaluation of the right upper quadrant revealed the gallbladder wall to be edematous consistent with moderate to severe acute cholecystitis. The gallbladder was grasped at the fundus and elevated superiorly. Lester's pouch was identified and this was retracted laterally to expose the triangle of Calot. Dissection in the cystic duct-gallbladder junction was performed to define the cystic duct for sufficient length. Dissection was kept above the line of Rouviere and a critical view was obtained. The cystic duct was then clipped once proximally on the specimen side. A ductotomy was made. A cholangiocatheter was introduced through the lateral 5 mm port. The cholangiocatheter was introduced into the cystic duct. An intraoperative cholangiogram was then performed. There was no evidence of a filling defect in the cystic or the common bile duct. The common bile duct tapers down smoothly to the duodenum and there was free flow of contrast into the duodenum. The proximal common hepatic duct and distal intrahepatic radicals were visualized  without any evidence of a filling defects or other abnormalities. Representative images were submitted to the Radiology Department for a final read. The cholangiocatheter was then removed. The cystic duct was then clipped  3 times distally. The cystic duct was then divided. The cystic artery was identified and seen  to ascend onto the gallbladder wall. This was also defined for a sufficient length and was clipped twice proximally and once distally and divided. Electrocautery was then used to dissect the gallbladder away from the liver bed. Having completed this maneuver the gallbladder was placed into a Endopouch and was withdrawn through the umbilical port.  The right upper quadrant was copiously irrigated until the irrigant was clear. The clips across the cystic duct and the cystic artery were examined and found to be well approximated and in good position. There was no evidence of bleeding or bile leakage from the liver bed. The accessory ports were removed under direct vision and there was no evidence of bleeding from the anterior abdominal wall. The abdomen was then deflated. The umbilical port was closed with 0 Vicryl. All skin incisions were closed with 4-0 Vicryl in a subcuticular manner. Steri-Strips and sterile dressings were placed. All sponge, instrument and needle counts were correct at the conclusion of the procedure. The patient did tolerate the procedure well. The patient was taken to the recovery room in stable condition. EBL:   Minimal     Pathologic specimens: The gallbladder and its contents    Jarrod Agustin MD, FACS      Please note that this report has been produced using speech recognition software and may contain errors related to that system including but not limited to errors in grammar, punctuation and spelling as well as words and phrases that possibly may have been recognized inappropriately. If there are any questions or concerns please contact the dictating provider for clarification.

## 2023-12-26 NOTE — PROGRESS NOTES
AVS reviewed, IV dc'd, will dc home when [place] delivered to bedside. Wife present in room, will dc home shortly .

## 2023-12-26 NOTE — PLAN OF CARE
A&Ox4. VSS. RA. . Denies chest pain and SOB. GI: Abdomen soft, nondistended. Denies passing gas after surgery. Denies nausea. : DTV. Pain controlled with PRN and scheduled pain medications. Up with standby assist.   Incisions: Laps x 4 with steris and bandaids are CDI, no drainage. Diet: Tolerating clears, ADAT. IVF running per order. All appropriate safety measures in place. All questions and concerns addressed.

## 2023-12-26 NOTE — ANESTHESIA PROCEDURE NOTES
Airway  Date/Time: 12/25/2023 8:41 PM  Urgency: elective      General Information and Staff    Patient location during procedure: OR  Anesthesiologist: Rosita Vela MD  Performed: anesthesiologist   Performed by: Rosita Vela MD  Authorized by:  Rosita Vela MD      Indications and Patient Condition  Indications for airway management: anesthesia  Sedation level: deep  Preoxygenated: yes  Patient position: sniffing  Mask difficulty assessment: 1 - vent by mask    Final Airway Details  Final airway type: endotracheal airway      Successful airway: ETT  Cuffed: yes   Successful intubation technique: Video laryngoscopy  Facilitating devices/methods: intubating stylet  Endotracheal tube insertion site: oral  Blade: GlideScope  Blade size: #3  ETT size (mm): 7.5    Placement verified by: capnometry   Measured from: lips  Number of attempts at approach: 1  Number of other approaches attempted: 0    Additional Comments  Dentition  unchanged from pre op exam

## 2023-12-26 NOTE — PROGRESS NOTES
NURSING DISCHARGE NOTE    Discharged Home via Ambulatory. Accompanied by Family member  Belongings Taken by patient/family. discussed discharge instructions with patient and family , answered all questions .  Verbalized understanding , pharmacy delivered new medicine

## 2023-12-26 NOTE — PLAN OF CARE
Problem: Patient/Family Goals  Goal: Patient/Family Long Term Goal  Description: Patient's Long Term Goal: Discharge    Interventions:  - Tolerate regular diet, tolerate pain, return of ADLs  - See additional Care Plan goals for specific interventions  Outcome: Adequate for Discharge  Goal: Patient/Family Short Term Goal  Description: Patient's Short Term Goal: Comfort    Interventions:   - Cluster care, meds per [facility], IVF   - See additional Care Plan goals for specific interventions  Outcome: Adequate for Discharge     Problem: PAIN - ADULT  Goal: Verbalizes/displays adequate comfort level or patient's stated pain goal  Description: INTERVENTIONS:  - Encourage pt to monitor pain and request assistance  - Assess pain using appropriate pain scale  - Administer analgesics based on type and severity of pain and evaluate response  - Implement non-pharmacological measures as appropriate and evaluate response  - Consider cultural and social influences on pain and pain management  - Manage/alleviate anxiety  - Utilize distraction and/or relaxation techniques  - Monitor for opioid side effects  - Notify MD/LIP if interventions unsuccessful or patient reports new pain  - Anticipate increased pain with activity and pre-medicate as appropriate  Outcome: Adequate for Discharge     Problem: RISK FOR INFECTION - ADULT  Goal: Absence of fever/infection during anticipated neutropenic period  Description: INTERVENTIONS  - Monitor WBC  - Administer growth factors as ordered  - Implement neutropenic guidelines  Outcome: Adequate for Discharge     Problem: SAFETY ADULT - FALL  Goal: Free from fall injury  Description: INTERVENTIONS:  - Assess pt frequently for physical needs  - Identify cognitive and physical deficits and behaviors that affect risk of falls.   - [place] fall precautions as indicated by assessment.  - Educate pt/family on patient safety including physical limitations  - Instruct pt to call for assistance with activity based on assessment  - Modify environment to reduce risk of injury  - Provide assistive devices as appropriate  - Consider OT/PT consult to assist with strengthening/mobility  - Encourage toileting schedule  Outcome: Adequate for Discharge     Problem: DISCHARGE PLANNING  Goal: Discharge to home or other facility with appropriate resources  Description: INTERVENTIONS:  - Identify barriers to discharge w/pt and caregiver  - Include patient/family/discharge partner in discharge planning  - Arrange for needed discharge resources and transportation as appropriate  - Identify discharge learning needs (meds, wound care, etc)  - Arrange for interpreters to assist at discharge as needed  - Consider post-discharge preferences of patient/family/discharge partner  - Complete POLST form as appropriate  - Assess patient's ability to be responsible for managing their own health  - Refer to Case Management Department for coordinating discharge planning if the patient needs post-hospital services based on physician/LIP order or complex needs related to functional status, cognitive ability or social support system  Outcome: Adequate for Discharge   Alert and orient x 4 , on room air , vitals stable , abdominal incision dry and intact , voids well . Tolerated diet well . Tolerated pain with oral pain medicine , up in room . Plan of care discussed , answered all questions . Verbalized understanding .  Will continue to monitor

## 2023-12-27 ENCOUNTER — PATIENT OUTREACH (OUTPATIENT)
Dept: CASE MANAGEMENT | Age: 43
End: 2023-12-27

## 2024-01-08 ENCOUNTER — TELEPHONE (OUTPATIENT)
Facility: LOCATION | Age: 44
End: 2024-01-08

## 2024-01-08 ENCOUNTER — APPOINTMENT (OUTPATIENT)
Dept: GENERAL RADIOLOGY | Facility: HOSPITAL | Age: 44
End: 2024-01-08
Attending: EMERGENCY MEDICINE
Payer: COMMERCIAL

## 2024-01-08 ENCOUNTER — HOSPITAL ENCOUNTER (EMERGENCY)
Facility: HOSPITAL | Age: 44
Discharge: HOME | End: 2024-01-08
Attending: EMERGENCY MEDICINE
Payer: COMMERCIAL

## 2024-01-08 VITALS
SYSTOLIC BLOOD PRESSURE: 131 MMHG | WEIGHT: 220 LBS | HEIGHT: 72 IN | RESPIRATION RATE: 16 BRPM | OXYGEN SATURATION: 98 % | HEART RATE: 71 BPM | BODY MASS INDEX: 29.8 KG/M2 | TEMPERATURE: 97 F | DIASTOLIC BLOOD PRESSURE: 84 MMHG

## 2024-01-08 DIAGNOSIS — M62.838 NECK MUSCLE SPASM: ICD-10-CM

## 2024-01-08 DIAGNOSIS — M54.2 ACUTE NECK PAIN: Primary | ICD-10-CM

## 2024-01-08 LAB
ANION GAP SERPL CALC-SCNC: 1 MMOL/L (ref 0–18)
BASOPHILS # BLD AUTO: 0.02 X10(3) UL (ref 0–0.2)
BASOPHILS NFR BLD AUTO: 0.3 %
BUN BLD-MCNC: 12 MG/DL (ref 9–23)
CALCIUM BLD-MCNC: 9.2 MG/DL (ref 8.5–10.1)
CHLORIDE SERPL-SCNC: 111 MMOL/L (ref 98–112)
CO2 SERPL-SCNC: 27 MMOL/L (ref 21–32)
CREAT BLD-MCNC: 1.13 MG/DL
EGFRCR SERPLBLD CKD-EPI 2021: 82 ML/MIN/1.73M2 (ref 60–?)
EOSINOPHIL # BLD AUTO: 0.25 X10(3) UL (ref 0–0.7)
EOSINOPHIL NFR BLD AUTO: 3.5 %
ERYTHROCYTE [DISTWIDTH] IN BLOOD BY AUTOMATED COUNT: 12.9 %
GLUCOSE BLD-MCNC: 113 MG/DL (ref 70–99)
HCT VFR BLD AUTO: 50.9 %
HGB BLD-MCNC: 16.5 G/DL
IMM GRANULOCYTES # BLD AUTO: 0.02 X10(3) UL (ref 0–1)
IMM GRANULOCYTES NFR BLD: 0.3 %
LYMPHOCYTES # BLD AUTO: 2.56 X10(3) UL (ref 1–4)
LYMPHOCYTES NFR BLD AUTO: 35.4 %
MCH RBC QN AUTO: 28.5 PG (ref 26–34)
MCHC RBC AUTO-ENTMCNC: 32.4 G/DL (ref 31–37)
MCV RBC AUTO: 87.9 FL
MONOCYTES # BLD AUTO: 0.37 X10(3) UL (ref 0.1–1)
MONOCYTES NFR BLD AUTO: 5.1 %
NEUTROPHILS # BLD AUTO: 4.02 X10 (3) UL (ref 1.5–7.7)
NEUTROPHILS # BLD AUTO: 4.02 X10(3) UL (ref 1.5–7.7)
NEUTROPHILS NFR BLD AUTO: 55.4 %
OSMOLALITY SERPL CALC.SUM OF ELEC: 289 MOSM/KG (ref 275–295)
PLATELET # BLD AUTO: 208 10(3)UL (ref 150–450)
POTASSIUM SERPL-SCNC: 4.3 MMOL/L (ref 3.5–5.1)
RBC # BLD AUTO: 5.79 X10(6)UL
SODIUM SERPL-SCNC: 139 MMOL/L (ref 136–145)
WBC # BLD AUTO: 7.2 X10(3) UL (ref 4–11)

## 2024-01-08 PROCEDURE — 80048 BASIC METABOLIC PNL TOTAL CA: CPT | Performed by: EMERGENCY MEDICINE

## 2024-01-08 PROCEDURE — 96374 THER/PROPH/DIAG INJ IV PUSH: CPT

## 2024-01-08 PROCEDURE — 99284 EMERGENCY DEPT VISIT MOD MDM: CPT

## 2024-01-08 PROCEDURE — 85025 COMPLETE CBC W/AUTO DIFF WBC: CPT | Performed by: EMERGENCY MEDICINE

## 2024-01-08 PROCEDURE — 99285 EMERGENCY DEPT VISIT HI MDM: CPT

## 2024-01-08 PROCEDURE — 72050 X-RAY EXAM NECK SPINE 4/5VWS: CPT | Performed by: EMERGENCY MEDICINE

## 2024-01-08 RX ORDER — CYCLOBENZAPRINE HCL 10 MG
10 TABLET ORAL 3 TIMES DAILY PRN
Qty: 20 TABLET | Refills: 0 | Status: SHIPPED | OUTPATIENT
Start: 2024-01-08 | End: 2024-01-15

## 2024-01-08 RX ORDER — KETOROLAC TROMETHAMINE 15 MG/ML
15 INJECTION, SOLUTION INTRAMUSCULAR; INTRAVENOUS ONCE
Status: COMPLETED | OUTPATIENT
Start: 2024-01-08 | End: 2024-01-08

## 2024-01-08 NOTE — ED QUICK NOTES
Pt resting comfortably, states his pain is the same but wants to wait to see what the MD says, pt VS WNL, breathing easy and unlabored.

## 2024-01-08 NOTE — ED PROVIDER NOTES
Patient Seen in: [facility] Emergency Department      History     Chief Complaint   Patient presents with    Headache     Headache since 25-post gall bladder surgery     Stated Complaint: HA since 12/26-post gallbladder surgery    Subjective:   HPI    This is a 43-year-old male presents emergency room for evaluation of posterior neck pain/head pain.  Patient states he had his gallbladder removed on 12/26/2023, states after having the surgery he felt pain to the posterior upper neck where the neck meets the head.  Patient reports he has pain with movement of the head, pain does not radiate extremities.  Patient denies any visual change.  Patient states he does not actually have headache at all of the back of the neck, denies any fevers or chills.  Denies feeling lightheaded or dizzy, denies vertiginous symptoms.  Denies trauma.  Denies chest pain or shortness of breath denies abdominal pain.  Denies any URI symptoms.    Objective:   History reviewed. No pertinent past medical history.           Past Surgical History:   Procedure Laterality Date    APPENDECTOMY      REMOVAL GALLBLADDER                  Social History     Socioeconomic History    Marital status:    Tobacco Use    Smoking status: Every Day     Packs/day: 1.00     Years: 20.00     Additional pack years: 0.00     Total pack years: 20.00     Types: Cigarettes    Smokeless tobacco: Never    Tobacco comments:     Referred book- Fazal Kruger- The Easy Way to Quit Smoking   Substance and Sexual Activity    Alcohol use: Yes     Comment: 3 a month    Drug use: No     Social Determinants of Health     Food Insecurity: No Food Insecurity (12/25/2023)    Food Insecurity     Food Insecurity: Never true   Transportation Needs: No Transportation Needs (12/25/2023)    Transportation Needs     Lack of Transportation: No   Housing Stability: Low Risk  (12/25/2023)    Housing Stability     Housing Instability: No              Review of Systems    Positive for  stated complaint: HA since 12/26-post gallbladder surgery  Other systems are as noted in HPI.  Constitutional and vital signs reviewed.      All other systems reviewed and negative except as noted above.    Physical Exam     ED Triage Vitals   BP 01/08/24 1131 (!) 133/91   Pulse 01/08/24 1131 80   Resp 01/08/24 1131 18   Temp 01/08/24 1131 97.6 °F (36.4 °C)   Temp src 01/08/24 1316 Temporal   SpO2 01/08/24 1131 99 %   O2 Device 01/08/24 1131 None (Room air)       Current:/84   Pulse 71   Temp 97.3 °F (36.3 °C) (Temporal)   Resp 16   Ht 182.9 cm (6')   Wt 99.8 kg   SpO2 98%   BMI 29.84 kg/m²         Physical Exam    GENERAL: Patient is awake, alert, well-appearing, in no acute distress.  HEENT: Pupils equal round reactive to light, extraocular muscles are intact, there is no scleral icterus.  Mucous membranes are moist, oropharynx is clear, uvula midline.  There is no tenderness or swelling to the head/scalp.  No rash..  Scalp is atraumatic.  NECK: No midline cervical spine tenderness.  There is exquisite tenderness to the paravertebral musculature to the right and left of the cervical spine, patient is exquisitely tender at the occipital atlantal region bilaterally.  No erythema swelling or rash.  Patient reports same location of pain he has been experiencing.  Neck is supple, there is no nuchal rigidity.  No carotid bruits.  No masses.  Trachea midline.  No cervical lymphadenopathy.  HEART: Regular rate and rhythm, no murmurs.  LUNGS: Clear to auscultation bilaterally.  No Rales, no rhonchi, no wheezing, no stridor.  ABDOMEN: Soft, nondistended,non tender,  NEUROLOGIC EXAM: Tongue midline, no facial drooping, no ptosis, muscle strength +5/5 bilateral upper and lower extremities     ED Course     Labs Reviewed   BASIC METABOLIC PANEL (8) - Abnormal; Notable for the following components:       Result Value    Glucose 113 (*)     All other components within normal limits   CBC W/ DIFFERENTIAL - Abnormal;  Notable for the following components:    RBC 5.79 (*)     All other components within normal limits   CBC WITH DIFFERENTIAL WITH PLATELET    Narrative:     The following orders were created for panel order CBC With Differential With Platelet.  Procedure                               Abnormality         Status                     ---------                               -----------         ------                     CBC W/ DIFFERENTIAL[[id number]]          Abnormal            Final result                 Please view results for these tests on the individual orders.                      MDM        Differential diagnosis before testing includes but not limited to cervical strain, cervical subluxation, muscular spasm, electrolyte abnormality, which is a medical condition that poses a threat to life/function    Radiographic images  I personally reviewed the radiographs and my individual interpretation shows x-ray of cervical spine no fractures noted  I also reviewed the official reports that showed no fracture, degenerative changes.  No prevertebral soft tissue swelling.    Medications Provided: Toradol    Course of Events during Emergency Room Visit include IV established, CBC and chemistry unremarkable.  Cervical spine x-ray performed.  On reevaluation patient states he feels better after the Toradol.  I discussed with patient exam is consistent with musculoskeletal etiology, patient does have exquisite tenderness to the posterior neck musculature, instructed to ice affected area, may alternate ibuprofen and Tylenol, will prescribe Flexeril.  Follow-up with primary care return to ER if any change or worsening symptoms.  Patient well-appearing agrees with plan discharge good condition    Shared decision making was utilized       Discharge  I have discussed with the patient the results of test, differential diagnosis, treatment plan, warning signs and symptoms which should prompt immediate return.  They expressed  understanding of these instructions and agrees to the following plan provided.  They were given written discharge instructions and agrees to return for any concerns and voiced understanding and all questions were answered.    Note to patient: The 21st Century Cures Act makes medical notes like these available to patients in the interest of transparency. However, this is a medical document intended as peer to peer communication. It is written in medical language and may contain abbreviations or verbiage that are unfamiliar. It may appear blunt or direct. Medical documents are intended to carry relevant information, facts as evident, and the clinical opinion of the practitioner.                                            Medical Decision Making      Disposition and Plan     Clinical Impression:  1. Acute neck pain    2. Neck muscle spasm         Disposition:  Discharge  1/8/2024  2:06 pm    Follow-up:  Chandrakant Mark MD  [id number] [address] [address] [phone]    Follow up in 2 day(s)            Medications Prescribed:  Current Discharge Medication List        START taking these medications    Details   cyclobenzaprine 10 MG Oral Tab Take 1 tablet (10 mg total) by mouth 3 (three) times daily as needed for Muscle spasms.  Qty: 20 tablet, Refills: 0

## 2024-01-08 NOTE — TELEPHONE ENCOUNTER
Patients surg was 12.25.2023   Currently going through dizziness / headaches and swelling back of head

## 2024-01-08 NOTE — ED INITIAL ASSESSMENT (HPI)
Pt to ER ambulatory, states HA pain to back of head since post gallbladder surgery on 25th of December. States vision is blurry r/t HA with pain at minimum of 3/10 getting as high as 8/10. Denies any double vision or dizziness. States was prescribed Norco post surgery and stopped taking 2 days ago as it did not improve pain. Posterior head swollen- per patient. Denies trauma or injury.    No deformities present

## 2024-01-12 NOTE — PAYOR COMM NOTE
--------------  ADMISSION REVIEW     Payor: [facility]  Subscriber #:  [id number]  Authorization Number: [id number]    Admit date: 12/25/23  Admit time: 11:51 AM       REVIEW DOCUMENTATION:     ED Provider Notes        ED Provider Notes signed by Deanna Polo MD at 12/25/2023 10:43 AM        Patient Seen in: [facility] In [place]      History     Chief Complaint   Patient presents with    Abdomen/Flank Pain     Stated Complaint: upper abdominal pain x1 week, +n/v    Subjective:   HPI    Patient with upper abdominal pain from the right side for the last week.  Worsened overnight, rates it a 8 out of 10, no associated nausea vomiting.  No diarrhea no fevers or chills.  Remote history of appendectomy  Last meal last night      Objective:   History reviewed. No pertinent past medical history.         Review of Systems    Positive for stated complaint: upper abdominal pain x1 week, +n/v  Other systems are as noted in HPI.  Constitutional and vital signs reviewed.      All other systems reviewed and negative except as noted above.    Physical Exam     ED Triage Vitals [12/25/23 0607]   BP (!) 159/102   Pulse 70   Resp 18   Temp 97.9 °F (36.6 °C)   Temp src Oral   SpO2 99 %   O2 Device None (Room air)       Current:/86   Pulse 85   Temp 97.9 °F (36.6 °C) (Oral)   Resp 16   Ht 180.3 cm (5' 11\")   Wt 99.8 kg   SpO2 98%   BMI 30.68 kg/m²         Physical Exam    Physical Exam   Constitutional: Awake, alert, well appearing  Head: Normocephalic and atraumatic.   Eyes: Conjunctivae are normal. Pupils are equal, round, and reactive to light.   Neck: Normal range of motion. Neck supple.   Cardiovascular: Normal rate, regular rhythm  Pulmonary/Chest: Normal effort.  No accessory muscle use.  No clubbing, no cyanosis.  Abdominal: Soft. Bowel sounds are normal.   Neurological: Pt is alert and oriented to person, place, and time. no cranial nerve deficits  Skin: Skin is warm and dry.    Persistently  tender the epigastrium and the right side no peritoneal signs    ED Course     Labs Reviewed   COMP METABOLIC PANEL (14) - Abnormal; Notable for the following components:       Result Value    Glucose 154 (*)     AST 69 (*)      (*)     All other components within normal limits   CBC W/ DIFFERENTIAL - Abnormal; Notable for the following components:    RBC 5.92 (*)     Neutrophil Absolute Prelim 7.84 (*)     Neutrophil Absolute 7.84 (*)     All other components within normal limits   LIPASE - Normal   TROPONIN I HIGH SENSITIVITY - Normal   CBC WITH DIFFERENTIAL WITH PLATELET    Narrative:     The following orders were created for panel order CBC With Differential With Platelet.  Procedure                               Abnormality         Status                     ---------                               -----------         ------                     CBC W/ DIFFERENTIAL[[id number]]          Abnormal            Final result                 Please view results for these tests on the individual orders.     EKG    Rate, intervals and axes as noted on EKG Report.  Rate: 67  Rhythm: Sinus Rhythm  Reading: Sinus rhythm acute ischemia  CMP shows mild transaminitis normal T. bili CBC fine lipase fine    CT ABDOMEN PELVIS IV CONTRAST, NO ORAL (ER)    Result Date: 12/25/2023  CONCLUSION:  Dilated gallbladder, with mild wall thickening and enhancement, without surrounding fluid or biliary ductal dilation.  Correlate for any potential signs or symptoms of cholecystitis.  No other potential acute or active abnormalities in the abdomen and pelvis.   LOCATION:  [facility]   Dictated by (CST): Bala Mares MD on 12/25/2023 at 8:36 AM     Finalized by (CST): Bala Mares MD on 12/25/2023 at 8:40 AM          Reexamined patient after Toradol, still rates pain an 8 out of 10 looks pretty uncomfortable.    Overall clinical picture concerning for acute cholecystitis        MDM          Differential diagnoses considered: Acute  cholecystitis, pancreatitis, gastritis, atypical presentation of ACS      Overall clinical picture concerning for acute cholecystitis  -Discussed with .  Agrees with admission and cholecystectomy when or available.    -Explained to the patient that there is unfortunately a backlog of urgent cases and that his cholecystectomy may not be till late tonight or potentially even tomorrow.      Patient will be admitted under Dr. Segundo.  The patient's wife drive him over.  Maintain NPO.  MIVF.      *My independent interpretation of radiographs: No free air or free fluid on CT scan abdomen pelvis    *Discussion of ongoing management of this patient's care included: Dr. Segundo  *Comorbidities contributing to the complexity of decision making: n/a  *External charts reviewed: n/a  *Additional sources of history: n/a    Shared decision making was done by: patient, myself, admitting team    Admission disposition: 12/25/2023  9:27 AM      Disposition and Plan     Clinical Impression:  1. Acute cholecystitis         Disposition:  Admit  12/25/2023  9:27 am       Hospital Problems       Present on Admission  Date Reviewed: 8/9/2022            ICD-10-CM Noted POA    * (Principal) Acute cholecystitis K81.0 12/25/2023 Unknown         Signed by Deanna Polo MD on 12/25/2023 10:43 AM            H&P - H&P Note        H&P signed by Criss Segundo MD at 12/25/2023  2:53 PM    Reason for Admission:  Acute cholecystitis    History of Present Illness:  Nikko Avery is a(n) 43 year old male who presented to [facility] on 12/25/2023 with concern of epigastric and right upper quadrant abdominal pain that has been ongoing for the last 3 weeks. He reports abdominal bloating for the last week. He reports poor appetite. He denies diarrhea. He reports occasionally oily stool. No nausea or vomiting.  He reports a history of gallbladder attacks in the last year. He denies any specific triggers.     Upon presentation to  the emergency department he was afebrile and hypertensive (159/102). Laboratory workup revealed No leukocytosis, WBC 10.8, hemoglobin 17.0, platelets 216,000. CMP revealed no gross electrolyte abnormalities, no acute kidney injury, creatinine 1.27. Mild elevation in LFTs, Ast 69, . No elevation in total bilirubin (0.5). Lipase within normal limits. A CT abdomen and pelvis was ordered and revealed dilated gallbladder with mild wall thickening and enhancement, without surrounding fluid or biliary ductal dilation.     Review of Systems:    Allergic/Immuno:  Patient has no known allergies.  Cardiovascular:  Negative for cool extremity and irregular heartbeat/palpitations  Constitutional:  Negative for decreased activity, fever, irritability and lethargy  Endocrine:  Negative for abnormal sleep patterns, increased activity, polydipsia and polyphagia  ENMT:  Negative for ear drainage, hearing loss and nasal drainage  Eyes:  Negative for eye discharge and vision loss  Gastrointestinal:  Positive for abdominal pain. Negative for  constipation, decreased appetite, diarrhea and vomiting  Genitourinary:  Negative for dysuria and hematuria  Hema/Lymph:  Negative for easy bleeding and easy bruising  Integumentary:  Negative for pruritus and rash  Musculoskeletal:  Negative for bone/joint symptoms  Neurological:  Negative for gait disturbance  Psychiatric:  Negative for inappropriate interaction and psychiatric symptoms  Respiratory:  Negative for cough, dyspnea and wheezing    Physical Exam:   General: Awake, Alert, Cooperative.  No apparent distress.  Vital Signs:  Blood pressure 140/86, pulse 85, temperature 97.9 °F (36.6 °C), temperature source Oral, resp. rate 16, height 71\", weight 220 lb, SpO2 98%.  HEENT: Exam is unremarkable.  Without scleral icterus.  Mucous membranes are moist. Pupils are equal and round, reactive to light and accommodate.  Pupils are approximately 3mm and react to 2mm with reaction to light.   Oropharynx is clear.  Neck:   Full range of motion to flexion and extension, lateral rotation and lateral flexion of cervical spine.  No JVD. Supple.   Lungs: No respiratory distress, effort normal.   Abdomen:  Soft, non-distended, tender to palpation in right upper quadrant, with no rebound or guarding.  No peritoneal signs. No ascites.  Liver is within normal limits.  Spleen is not palpable.    Extremities:  No lower extremity edema noted.  Without clubbing or cyanosis.    Skin: Normal texture and turgor.  Lymphatic:  No palpable cervical lymphadenopathy.  Neurologic: Cranial nerves are grossly intact.  Motor strength and sensory examination is grossly normal.  No focal neurologic deficit.    Laboratory Data:  Lab Results   Component Value Date    WBC 10.8 12/25/2023    HGB 17.0 12/25/2023    HCT 51.7 12/25/2023    .0 12/25/2023     No results found for: \"PT\", \"INR\"  Lab Results   Component Value Date     12/25/2023    K 4.1 12/25/2023     12/25/2023    CO2 29.0 12/25/2023    BUN 18 12/25/2023    CREATSERUM 1.27 12/25/2023     12/25/2023    CA 8.8 12/25/2023    ALKPHO 79 12/25/2023     12/25/2023    AST 69 12/25/2023    BILT 0.5 12/25/2023    ALB 4.3 12/25/2023    TP 8.2 12/25/2023         Impression and Plan:  Patient Active Problem List   Diagnosis    Epigastric pain    Substernal pain    Class 1 obesity due to excess calories without serious comorbidity with body mass index (BMI) of 30.0 to 30.9 in adult    High cholesterol    Elevated blood sugar    Elevated liver enzymes    Acute cholecystitis     43-year-old gentleman who presents to the emergency department in [place] with complaints of epigastric and right upper quadrant abdominal pain.  The patient reports that he has had intermittent symptoms of abdominal pain in the epigastrium and the right upper quadrant for approximately 1 year.  Over the past 3 weeks he has noted increased fatty food intolerance, abdominal  bloating, early satiety.  He describes epigastric abdominal pain which radiates to the right upper quadrant and subscapular region.  Patient reports that this particular episode was significantly worse than before and did not resolve spontaneously so he sought evaluation in the emergency department.  Workup in the ED revealed normal WBC, mild elevation of LFTs however total bilirubin was within normal limits.  CT scan abdomen pelvis revealed a dilated gallbladder up to 10 cm with gallbladder wall thickening.  The patient was treated for symptoms in the emergency department however despite medication, the patient's abdominal pain and symptoms persisted.  The patient was transported to [facility] for definitive surgical intervention.    Treatment options were reviewed in detail with the patient as well as his wife at the bedside.  At this time the patient does wish to proceed with laparoscopic cholecystectomy with intraoperative cholangiogram for symptomatic cholecystitis.  The patient has no further questions at this time and wishes to proceed with surgery today as discussed    The risks, benefits, complications, possible outcomes and alternatives of the procedure were explained to the patient in detail.   Expected postoperative pain, recuperation and postoperative course was also reviewed.  All questions from the patient were answered in detail.  The patient did verbalize understanding and does not have any further questions at this time.  The patient does wish to proceed with the proposed procedure.      CHAVEZ Segundo MD, FACS    Please note that this report has been produced using speech recognition software and may contain errors related to that system including but not limited to errors in grammar, punctuation and spelling as well as words and phrases that possibly may have been recognized inappropriately.  If there are any questions or concerns please contact the dictating provider for clarification.  The 21st  Century Cures Act makes medical notes like these available to patients in the interest of trans parency. Please be advised this is a medical document. Medical documents are intended to carry relevant information, facts as evident, and the clinical opinion of the practitioner. The medical note is intended as peer to peer communication and may appear blunt or direct. It is written in medical language and may contain abbreviations or verbiage that are unfamiliar.  If there are any questions or concerns please contact the dictating provider for clarification.          Plan:        Is this a shared or split note between Advanced Practice Provider and Physician? Yes     Electronically signed by Criss Segundo MD on 12/25/2023  2:53 PM           OR REPORT  Date of procedure:   December 25, 2023     Pre-Operative Diagnosis: Acute cholecystitis, cholelithiasis     Indication for Surgery: Persistent biliary colic and abdominal pain due to acute cholecystitis, cholelithiasis     Post-Operative Diagnosis/Operative Findings: Same as above-negative intraoperative cholangiogram     Procedure performed:  Laparoscopic cholecystectomy with intraoperative cholangiogram     Surgeon(s) and Role:     * Criss Segundo MD - Primary     Assist:     SA Hayden     Anesthesia: General     History of Present Illness:    43-year-old gentleman who presents to the emergency department in [place] with complaints of epigastric and right upper quadrant abdominal pain.  The patient reports that he has had intermittent symptoms of abdominal pain in the epigastrium and the right upper quadrant for approximately 1 year.  Over the past 3 weeks he has noted increased fatty food intolerance, abdominal bloating, early satiety.  He describes epigastric abdominal pain which radiates to the right upper quadrant and subscapular region.  Patient reports that this particular episode was significantly worse than before and did not resolve  spontaneously so he sought evaluation in the emergency department.  Workup in the ED revealed normal WBC, mild elevation of LFTs however total bilirubin was within normal limits.  CT scan abdomen pelvis revealed a dilated gallbladder up to 10 cm with gallbladder wall thickening.  The patient was treated for symptoms in the emergency department however despite medication, the patient's abdominal pain and symptoms persisted.  The patient was transported to [facility] for definitive surgical intervention.     Treatment options were reviewed in detail with the patient as well as his wife at the bedside.  At this time the patient does wish to proceed with laparoscopic cholecystectomy with intraoperative cholangiogram for symptomatic cholecystitis.  The patient has no further questions at this time and wishes to proceed with surgery today as discussed     Discussed with patient:  The potential risks and benefits of the procedure were discussed in detail with the patient including but not limited to bleeding, infections, seroma/hematoma formation, postoperative wound complications including development of a hernia, trocar injury, intra-abdominal organ injury, bile leakage, biloma formation, common bile duct injury, conversion to an open procedure, inability to complete the cholangiogram, possible need for a drain, possible recurrence or persistence of symptoms despite surgery, postoperative diarrhea, possible need for further treatment or intervention pending cholangiogram results.  These and other potential intraoperative and perioperative risks were discussed and the patient does not have any questions and does wish to proceed with surgery today.     Note:  A surgical assistant was essential to the performance and conduct of this case, especially in the performance of the cholangiogram and the careful dissection needed in and around the triangle of Calot and gallbladder hilum.     Summary of Procedure:   The patient  was taken to the operating room and was placed on the OR table in the supine position. After the induction of general anesthesia perioperative antibiotics were given. The patient was then prepped and draped in usual sterile fashion. Using local anesthesia a kirsten-umbilical incision was made and the anterior abdominal fascia was elevated with a Kocker forcep. The Veress needle was introduced into the abdomen and the abdomen was insufflated to 15 mm mercury. The Veress needle was then exchanged for a 11 mm port. The laparoscope was introduced.  A 5 mm epigastric port and two 5 mm lateral ports were placed under direct vision without incidence.  The patient was placed into a reverse Trendelenburg position with the right side up.       Initial evaluation of the right upper quadrant revealed the gallbladder wall to be edematous consistent with moderate to severe acute cholecystitis.  The gallbladder was grasped at the fundus and elevated superiorly.   Lester's pouch was identified and this was retracted laterally to expose the triangle of Calot.  Dissection in the cystic duct-gallbladder junction was performed to define the cystic duct for sufficient length.  Dissection was kept above the line of Rouviere and a critical view was obtained.  The cystic duct was then clipped once proximally on the specimen side.  A ductotomy was made.  A cholangiocatheter was introduced through the lateral 5 mm port.   The cholangiocatheter was introduced into the cystic duct.  An intraoperative cholangiogram was then performed. There was no evidence of a filling defect in the cystic or the common bile duct. The common bile duct tapers down smoothly to the duodenum and there was free flow of contrast into the duodenum. The proximal common hepatic duct and distal intrahepatic radicals were visualized  without any evidence of a filling defects or other abnormalities. Representative images were submitted to the Radiology Department for a final  read.  The cholangiocatheter was then removed.    The cystic duct was then clipped  3 times distally. The cystic duct was then divided. The cystic artery was identified and seen  to ascend onto the gallbladder wall.  This was also defined for a sufficient length and was clipped twice proximally and once distally and divided. Electrocautery was then used to dissect the gallbladder away from the liver bed. Having completed this maneuver the gallbladder was placed into a Endopouch and was withdrawn through the umbilical port. The right upper quadrant was copiously irrigated until the irrigant was clear. The clips across the cystic duct and the cystic artery were examined and found to be well approximated and in good position. There was no evidence of bleeding or bile leakage from the liver bed.  The accessory ports were removed under direct vision and there was no evidence of bleeding from the anterior abdominal wall. The abdomen was then deflated. The umbilical port was closed with 0 Vicryl.  All skin incisions were closed with 4-0 Vicryl in a subcuticular manner.    Steri-Strips and sterile dressings were placed.  All sponge, instrument and needle counts were correct at the conclusion of the procedure. The patient did tolerate the procedure well.  The patient was taken to the recovery room in stable condition.     EBL:   Minimal      Pathologic specimens: The gallbladder and its contents     CHAVEZ Segundo MD, FACS        Please note that this report has been produced using speech recognition software and may contain errors related to that system including but not limited to errors in grammar, punctuation and spelling as well as words and phrases that possibly may have been recognized inappropriately.  If there are any questions or concerns please contact the dictating provider for clarification.               12/26 DISCHARGED  ubjective:  The patient reports that he is doing well today.  He reports mild incisional pain.   He denies nausea or vomiting.  He denies fever or chills.     Objective/Physical Exam:  General: Alert, orientated x3.  Cooperative.  No apparent distress.  Vital Signs:  Blood pressure 131/80, pulse 101, temperature 98.8 °F (37.1 °C), temperature source Oral, resp. rate 18, height 71\", weight 220 lb (99.8 kg), SpO2 95%.  Lungs: No respiratory distress.  Cardiac: Regular rate and rhythm.   Abdomen:  Soft, non distended, non tender, with no rebound or guarding.  No peritoneal signs.   Extremities:  No lower extremity edema noted.    Incision: Clean, dry, intact, no erythema        Labs:        Lab Results   Component Value Date     WBC 11.2 12/26/2023     HGB 15.3 12/26/2023     HCT 45.8 12/26/2023     .0 12/26/2023            Lab Results   Component Value Date      12/26/2023     K 4.3 12/26/2023      12/26/2023     CO2 25.0 12/26/2023     BUN 15 12/26/2023     CREATSERUM 1.10 12/26/2023      12/26/2023     CA 8.8 12/26/2023     ALKPHO 82 12/26/2023      12/26/2023     AST 98 12/26/2023     BILT 1.7 12/26/2023     ALB 3.8 12/26/2023     TP 7.1 12/26/2023      No results found for: \"PT\", \"INR\"     I/O last 3 completed shifts:  In: 1460.8 [P.O.:400; I.V.:1060.8]  Out: 15 [Blood:15]  I/O this shift:  In: 360 [P.O.:360]  Out: -      Assessment      Patient Active Problem List   Diagnosis    Epigastric pain    Substernal pain    Class 1 obesity due to excess calories without serious comorbidity with body mass index (BMI) of 30.0 to 30.9 in adult    High cholesterol    Elevated blood sugar    Elevated liver enzymes    Acute cholecystitis    Elevated LFTs         Postop day 1 laparoscopic cholecystectomy     Plan:  The patient is stable for discharge to home.  Continue diet as tolerated.  Analgesics as needed.  Discharge instructions were reviewed with the patient.  Follow-up with [facility] general surgery in 2 weeks, sooner if needed.        Emelina Andersen PA-C  12/26/2023  10:09 AM    Medication  Administration Report  for Nikko Avery as of 12/17/23 through 12/26/23  Legend:       Medications 12/17 12/18 12/19 12/20 12/21 12/22 12/23 12/24 12/25 12/26   Completed Medications   famotidine (Pepcid) 20 mg/2mL injection 20 mg  Dose: 20 mg  Freq: Once Route: IV  Start: 12/25/23 0710 End: 12/25/23 0713            [id number]         HYDROmorphone (Dilaudid) 1 MG/ML injection 1 mg  Dose: 1 mg  Freq: Once Route: IV  Start: 12/25/23 0929 End: 12/25/23 0934            [id number]         iopamidol 76% (ISOVUE-370) injection for power injector  Dose: 100 mL  Freq: IMG once as needed Route: IV  PRN Reason: contrast  Start: 12/25/23 0828 End: 12/25/23 0820            03226         ketorolac (Toradol) 15 MG/ML injection 15 mg  Dose: 15 mg  Freq: Once Route: IV  Start: 12/25/23 0710 End: 12/25/23 0712            [id number]         sodium chloride 0.9% infusion  Freq: Once Route: IV  Start: 12/25/23 0921 End: 12/25/23 1004            [id number]     [id number]         Discontinued Medications   Medications 12/17 12/18 12/19 12/20 12/21 12/22 12/23 12/24 12/25 12/26                                          acetaminophen (Tylenol) tab 650 mg  Dose: 650 mg  Freq: Every 4 hours while awake Route: OR  Start: 12/25/23 2300 End: 12/26/23 1428            ([id number]     10                            Or  HYDROcodone-acetaminophen (Norco) 5-325 MG per tab 2 tablet  Dose: 2 tablet  Freq: Every 4 hours PRN Route: OR  PRN Reason: severe pain  Start: 12/25/23 1223 End: 12/26/23 1427   Admin Instructions:   Use PRN reason as a guide and follow range order policy            [id number]      [id number]     [id number]     [id number]                     bupivacaine 0.5%-EPINEPHrine 1:200,000 PF (Marcaine/Epinephrine PF) injection  Freq: As needed  Start: 12/25/23 2055 End: 12/25/23 2145 205521                                   heparin (Porcine) 5000 UNIT/ML injection 5,000 Units  Dose: 5,000 Units  Freq: 2 times per day Route: SC  Start: 12/26/23 0648  End: 12/26/23 1428             [id number]        heparin (Porcine) 5000 UNIT/ML injection 5,000 Units  Dose: 5,000 Units  Freq: Every 8 hours scheduled Route: SC  Start: 12/25/23 2030 End: 12/26/23 1428 202223     [id number]      [id number]     [id number]                    Or  HYDROmorphone (Dilaudid) 1 MG/ML injection 0.4 mg  Dose: 0.4 mg  Freq: Every 2 hour PRN Route: IV  PRN Reason: severe pain  Start: 12/25/23 2248 End: 12/26/23 1428   Admin Instructions:   Use PRN reason as a guide and follow range order policy. If oral pain meds are ordered and patient can tolerate oral intake, start with PRN oral pain medications first.            [id number]                                                 HYDROmorphone (Dilaudid) 1 MG/ML injection 0.2 mg  Dose: 0.2 mg  Freq: Every 2 hour PRN Route: IV  PRN Reason: mild pain  Start: 12/25/23 1224 End: 12/26/23 1428   Admin Instructions:   Use PRN reason as a guide and follow range order policy. If oral pain meds are ordered and patient can tolerate oral intake, start with PRN oral pain medications first.            [id number]      [id number]                                  iothalamate meglumine (CONRAY) 60 % injection  Freq: As needed  Start: 12/25/23 2110 End: 12/25/23 2145 2110 [C]36                      lactated ringers infusion  Rate: 75 mL/hr Freq: Continuous Route: IV  Start: 12/25/23 2300 End: 12/26/23 1428            [id number]         lactated ringers infusion  Rate: 100 mL/hr Freq: Continuous Route: IV  Start: 12/25/23 2145 End: 12/25/23 2250            [id number]         lactated ringers infusion  Rate: 75 mL/hr Freq: Continuous Route: IV  Start: 12/25/23 1230 End: 12/26/23 1428            [id number]     [id number]     [id number]                                                                                                                                                        Medications 12/17 12/18 12/19 12/20 12/21 12/22 12/23 12/24 12/25 12/26   Administration Detail         Vitals  (last day) before discharge       Date/Time Temp Pulse Resp BP SpO2 Weight O2 Device O2 Flow Rate (L/min) [facility]    12/26/23 0835 98.8 °F (37.1 °C) 101 18 131/80 95 % -- None (Room air) 0 L/min SC    12/26/23 0350 98.9 °F (37.2 °C) 87 16 137/89 92 % -- Nasal cannula 2 L/min     12/25/23 2300 99.2 °F (37.3 °C) 82 -- 139/89 93 % -- Nasal cannula 2 L/min     12/25/23 2300 -- -- 16 -- -- -- -- --     12/25/23 2230 -- 85 19 -- 95 % -- Nasal cannula 2 L/min     12/25/23 2228 -- 87 20 -- 91 % -- None (Room air) --     12/25/23 2225 -- 88 22 -- 93 % -- -- --     12/25/23 2220 -- 86 21 139/76 94 % -- -- --     12/25/23 2215 -- 95 20 145/92 95 % -- Nasal cannula 2 L/min     12/25/23 2210 -- 83 20 -- 95 % -- -- --     12/25/23 2205 -- 83 22 149/93 95 % -- -- --     12/25/23 2200 -- 86 22 147/93 94 % -- Nasal cannula 2 L/min     12/25/23 2155 -- 87 20 163/102 95 % -- Nasal cannula 2 L/min     12/25/23 2150 -- 92 26 154/103 89 % -- None (Room air) --     12/25/23 2146 98.7 °F (37.1 °C) 97 13 -- 95 % -- -- --     12/25/23 2145 98.7 °F (37.1 °C) 99 20 161/106 95 % -- None (Room air) --     12/25/23 1930 98.2 °F (36.8 °C) 85 16 136/71 97 % -- None (Room air) --     12/25/23 1659 98.5 °F (36.9 °C) 79 18 132/75 98 % -- -- -- DW    12/25/23 1003 97.8 °F (36.6 °C) -- -- -- -- -- -- -- DW    12/25/23 1003 -- 85 16 140/86 98 % -- None (Room air) -- RS    12/25/23 0923 -- 81 16 135/86 96 % -- None (Room air) -- DP    12/25/23 0710 -- 83 16 137/97 96 % -- None (Room air) -- DP    12/25/23 0607 97.9 °F (36.6 °C) 70 18 159/102 99 % 220 lb None (Room air) -- PM

## (undated) DEVICE — LIGHT HANDLE

## (undated) DEVICE — STERILE POLYISOPRENE POWDER-FREE SURGICAL GLOVES: Brand: PROTEXIS

## (undated) DEVICE — TROCAR: Brand: KII SHIELDED BLADED ACCESS SYSTEM

## (undated) DEVICE — GRABBER GRASPER TIP, DISPOSABLE: Brand: RENEW

## (undated) DEVICE — #11 STERILE BLADE: Brand: POLYMER COATED BLADES

## (undated) DEVICE — SHEET,DRAPE,40X58,STERILE: Brand: MEDLINE

## (undated) DEVICE — SLEEVE COMPR M KNEE LEN SGL USE KENDALL SCD

## (undated) DEVICE — KIT,ANTI FOG,W/SPONGE & FLUID,SOFT PACK: Brand: MEDLINE

## (undated) DEVICE — L-HOOK CAUTERY PROBE TIP, DISPOSABLE: Brand: RENEW

## (undated) DEVICE — BANDAGE ADH W1INXL3IN NAT FAB WVN N ADH PD

## (undated) DEVICE — C-ARM: Brand: UNBRANDED

## (undated) DEVICE — APPLICATOR SKIN PREP 26ML HI LT ORNG 2% CHG

## (undated) DEVICE — UNDYED BRAIDED (POLYGLACTIN 910), SYNTHETIC ABSORBABLE SUTURE: Brand: COATED VICRYL

## (undated) DEVICE — ZIPWIRE GUIDEWIRE .035X150 STR

## (undated) DEVICE — SUTURE VCRL SZ 0 L54IN ABSRB UD POLYGLACTIN

## (undated) DEVICE — 40580 - THE PINK PAD - ADVANCED TRENDELENBURG POSITIONING KIT: Brand: 40580 - THE PINK PAD - ADVANCED TRENDELENBURG POSITIONING KIT

## (undated) DEVICE — LAP CHOLE/APPY CDS-LF: Brand: MEDLINE INDUSTRIES, INC.

## (undated) DEVICE — LAPCLINCH GRASPER TIP, DISPOSABLE: Brand: RENEW

## (undated) DEVICE — SOLUTION IRRIG 1000ML 0.9% NACL USP BTL

## (undated) DEVICE — SUTURE VCRL SZ 0 L27IN ABSRB VLT L26MM UR-6

## (undated) DEVICE — POUCH SPECIMEN WIRE 6X3 250ML

## (undated) DEVICE — TROCAR: Brand: KII® SLEEVE

## (undated) DEVICE — SYRINGE MED 10ML LL TIP W/O SFTY DISP

## (undated) DEVICE — PTFE COATED BLADE 2.75': Brand: MEDLINE

## (undated) DEVICE — TIGERTAIL 5F FLXTIP 70CM

## (undated) DEVICE — ENDOPATH ULTRA VERESS INSUFFLATION NEEDLES WITH LUER LOCK CONNECTORS: Brand: ENDOPATH

## (undated) DEVICE — MINI ENDOCUT SCISSOR TIP, DISPOSABLE: Brand: RENEW

## (undated) DEVICE — DISPOSABLE LAPAROSCOPIC CLIP APPLIER WITH 20 CLIPS.: Brand: EPIX® UNIVERSAL CLIP APPLIER

## (undated) DEVICE — TRADITIONAL MARYLAND DISSECTOR TIP, DISPOSABLE: Brand: RENEW

## (undated) NOTE — LETTER
06/19/19        Paul Garcia  [address] [id number]      Dear Erika Mar,    [facility] records indicate that you have outstanding lab work and or testing that was ordered for you and has not yet been completed.   Our lab hours:  Monday and Tu

## (undated) NOTE — ED AVS SNAPSHOT
Priscilla Mcknight   MRN: [id number]    Department:  [facility] Emergency Department   Date of Visit:  8/28/2017           Disclosure     Insurance plans vary and the physician(s) referred by the ER may not be covered by your plan.  Please contact your If you have been prescribed any medication(s), please fill your prescription right away and begin taking the medication(s) as directed    If the emergency physician has read X-rays, these will be re-interpreted by a radiologist.  If there is a significant

## (undated) NOTE — ED AVS SNAPSHOT
Prakash Thakur   MRN: [id number]    Department:  [facility] [place]   Date of Visit:  1/11/2019           Disclosure     Insurance plans vary and the physician(s) referred by the ER may not be covered by your plan.  Please contact tell this physician (or your personal doctor if your instructions are to return to your personal doctor) about any new or lasting problems. The primary care or specialist physician will see patients referred from the [facility] Emergency Department.  Casper Mae